# Patient Record
Sex: MALE | URBAN - METROPOLITAN AREA
[De-identification: names, ages, dates, MRNs, and addresses within clinical notes are randomized per-mention and may not be internally consistent; named-entity substitution may affect disease eponyms.]

---

## 2023-08-23 ENCOUNTER — APPOINTMENT (OUTPATIENT)
Dept: URBAN - METROPOLITAN AREA CLINIC 302 | Age: 73
Setting detail: DERMATOLOGY
End: 2023-08-23

## 2023-08-23 VITALS — WEIGHT: 210 LBS | HEIGHT: 74 IN

## 2023-08-23 DIAGNOSIS — D49.2 NEOPLASM OF UNSPECIFIED BEHAVIOR OF BONE, SOFT TISSUE, AND SKIN: ICD-10-CM

## 2023-08-23 DIAGNOSIS — D18.0 HEMANGIOMA: ICD-10-CM

## 2023-08-23 DIAGNOSIS — D22 MELANOCYTIC NEVI: ICD-10-CM

## 2023-08-23 DIAGNOSIS — L82.1 OTHER SEBORRHEIC KERATOSIS: ICD-10-CM

## 2023-08-23 DIAGNOSIS — L81.4 OTHER MELANIN HYPERPIGMENTATION: ICD-10-CM

## 2023-08-23 PROBLEM — D22.5 MELANOCYTIC NEVI OF TRUNK: Status: ACTIVE | Noted: 2023-08-23

## 2023-08-23 PROBLEM — D22.62 MELANOCYTIC NEVI OF LEFT UPPER LIMB, INCLUDING SHOULDER: Status: ACTIVE | Noted: 2023-08-23

## 2023-08-23 PROBLEM — D18.01 HEMANGIOMA OF SKIN AND SUBCUTANEOUS TISSUE: Status: ACTIVE | Noted: 2023-08-23

## 2023-08-23 PROBLEM — D22.39 MELANOCYTIC NEVI OF OTHER PARTS OF FACE: Status: ACTIVE | Noted: 2023-08-23

## 2023-08-23 PROBLEM — D22.61 MELANOCYTIC NEVI OF RIGHT UPPER LIMB, INCLUDING SHOULDER: Status: ACTIVE | Noted: 2023-08-23

## 2023-08-23 PROCEDURE — OTHER MIPS QUALITY: OTHER

## 2023-08-23 PROCEDURE — OTHER BIOPSY BY SHAVE METHOD: OTHER

## 2023-08-23 PROCEDURE — OTHER COUNSELING: OTHER

## 2023-08-23 PROCEDURE — OTHER REASSURANCE: OTHER

## 2023-08-23 PROCEDURE — 11102 TANGNTL BX SKIN SINGLE LES: CPT

## 2023-08-23 PROCEDURE — 99213 OFFICE O/P EST LOW 20 MIN: CPT | Mod: 25

## 2023-08-23 PROCEDURE — 11103 TANGNTL BX SKIN EA SEP/ADDL: CPT

## 2023-08-23 ASSESSMENT — LOCATION SIMPLE DESCRIPTION DERM
LOCATION SIMPLE: ABDOMEN
LOCATION SIMPLE: RIGHT CHEEK
LOCATION SIMPLE: LEFT CHEEK
LOCATION SIMPLE: LEFT FOREHEAD
LOCATION SIMPLE: RIGHT FOREARM
LOCATION SIMPLE: LEFT UPPER ARM
LOCATION SIMPLE: LEFT NOSE
LOCATION SIMPLE: LEFT FOREARM
LOCATION SIMPLE: RIGHT FOREHEAD
LOCATION SIMPLE: CHEST
LOCATION SIMPLE: RIGHT UPPER ARM

## 2023-08-23 ASSESSMENT — LOCATION DETAILED DESCRIPTION DERM
LOCATION DETAILED: LEFT CENTRAL MALAR CHEEK
LOCATION DETAILED: PERIUMBILICAL SKIN
LOCATION DETAILED: RIGHT SUPERIOR MEDIAL BUCCAL CHEEK
LOCATION DETAILED: LEFT INFERIOR FOREHEAD
LOCATION DETAILED: LEFT ANTERIOR DISTAL UPPER ARM
LOCATION DETAILED: RIGHT ANTERIOR PROXIMAL UPPER ARM
LOCATION DETAILED: LEFT LATERAL SUPERIOR CHEST
LOCATION DETAILED: RIGHT ANTERIOR DISTAL UPPER ARM
LOCATION DETAILED: RIGHT INFERIOR FOREHEAD
LOCATION DETAILED: LEFT LATERAL ABDOMEN
LOCATION DETAILED: LEFT ANTERIOR PROXIMAL UPPER ARM
LOCATION DETAILED: RIGHT VENTRAL PROXIMAL FOREARM
LOCATION DETAILED: RIGHT PROXIMAL DORSAL FOREARM
LOCATION DETAILED: LEFT NASAL ALA
LOCATION DETAILED: LEFT VENTRAL PROXIMAL FOREARM
LOCATION DETAILED: RIGHT MEDIAL SUPERIOR CHEST

## 2023-08-23 ASSESSMENT — LOCATION ZONE DERM
LOCATION ZONE: FACE
LOCATION ZONE: ARM
LOCATION ZONE: NOSE
LOCATION ZONE: TRUNK

## 2023-09-07 ENCOUNTER — APPOINTMENT (OUTPATIENT)
Dept: URBAN - METROPOLITAN AREA CLINIC 302 | Age: 73
Setting detail: DERMATOLOGY
End: 2023-09-07

## 2023-09-07 PROBLEM — C44.622 SQUAMOUS CELL CARCINOMA OF SKIN OF RIGHT UPPER LIMB, INCLUDING SHOULDER: Status: ACTIVE | Noted: 2023-09-07

## 2023-09-07 PROCEDURE — OTHER CURETTAGE AND DESTRUCTION: OTHER

## 2023-09-07 PROCEDURE — OTHER MIPS QUALITY: OTHER

## 2023-09-07 PROCEDURE — 17262 DSTRJ MAL LES T/A/L 1.1-2.0: CPT

## 2023-09-19 ENCOUNTER — APPOINTMENT (OUTPATIENT)
Dept: URBAN - METROPOLITAN AREA CLINIC 301 | Age: 73
Setting detail: DERMATOLOGY
End: 2023-09-20

## 2023-09-19 PROBLEM — C44.311 BASAL CELL CARCINOMA OF SKIN OF NOSE: Status: ACTIVE | Noted: 2023-09-19

## 2023-09-19 PROCEDURE — 77334 RADIATION TREATMENT AID(S): CPT

## 2023-09-19 PROCEDURE — OTHER IMAGE GUIDED - SUPERFICIAL RADIOTHERAPY: OTHER

## 2023-09-19 PROCEDURE — G6001 ECHO GUIDANCE RADIOTHERAPY: HCPCS

## 2023-09-19 PROCEDURE — 77261 THER RADIOLOGY TX PLNG SMPL: CPT

## 2023-09-19 PROCEDURE — 77280 THER RAD SIMULAJ FIELD SMPL: CPT

## 2023-09-19 PROCEDURE — 77300 RADIATION THERAPY DOSE PLAN: CPT

## 2023-09-19 PROCEDURE — OTHER IMAGE GUIDED - SUPERFICIAL RADIOTHERAPY: SIMULATION VISIT: OTHER

## 2023-09-19 NOTE — PROCEDURE: IMAGE GUIDED - SUPERFICIAL RADIOTHERAPY: SIMULATION VISIT
Additional Comments (Add Customization Of Note Here): - Mr. Casarez presented for simulation. I instructed him to wash treated site with mild soap, \\navoid scrubbing, avoid shaving, pat dry only, avoid sun exposure, and protect skin with products that contain SPF 30 or \\nhigher. Patient is to avoid lotions with alcohol and/or steroid creams, only using those recommended by provider.

## 2023-09-19 NOTE — PROCEDURE: IMAGE GUIDED - SUPERFICIAL RADIOTHERAPY: SIMULATION VISIT
Bill For Treatment Planning: Yes- (Simple Planning- 1 Site: [id number]) Bill For Treatment Planning: Yes- (Simple Planning- 1 Site: [id number])

## 2023-09-19 NOTE — PROCEDURE: IMAGE GUIDED - SUPERFICIAL RADIOTHERAPY: SIMULATION VISIT
Simulation Documentation: Per the request of Dr. Reyes patient was seen today for Superficial Radiation Therapy planning requiring initial \\nsimulation in preparation for treatment of specific diseased sites. Simulation is necessary to determine the correct \\npatient and treatment portal positioning, to deliver safe and effective Radiotherapy. A high-frequency ultrasound \\nimage was acquired prior to treatment today for two- dimensional evaluation of tumor volume and will be \\nrepeated per fraction for response to treatment, in addition, to geometric accuracy of field placement. Physician \\nevaluation of the ultrasound tumor depth, width, and breadth will be ongoing through the course of treatment \\nand is deemed medically necessary ensuring efficacy of treatment and determine whether to proceed with \\ndelivery of therapeutic. Today’s image and setup were evaluated to determine the initiation of treatment. All \\nappropriate blocking and treatment parameters were verified by the radiation therapist and provider.\\nPer Dr. Clemente, continued per fraction ultrasound guidance and simulation are required for field placement, \\nmeasurement of tumor depth, width and breadth, progress, and edema monitoring.\\nPer the request of Dr. Clemente, continuing medical physics review as per radiotherapy standard of care post every \\n5th fraction for the patient, including assessment of treatment parameters,  of dose delivery, and \\nreview of patient treatment documentation in support of the provider, is ordered, ensuring efficacy and continued \\nsafe delivery of radiotherapy. Included in the physics check is a review of patient setup information, all pertinent \\nsimulation and treatment photograph(s) checks, prescription, dose calculation verification, per fraction dose \\ncharted correctly, elapsed days and treatment days correctly charted, cumulative dose correct, and review of any \\nprescription changes. Continued medical physics review post every 5th fraction of radiotherapy is requested by the \\nprovider for appropriate radiotherapy management and is deemed medically necessary and standard of care.

## 2023-09-19 NOTE — PROCEDURE: IMAGE GUIDED - SUPERFICIAL RADIOTHERAPY
Body Location Override (Optional): Lt Nasal Ala
Detail Level: Detailed
Pathology Override (Pathology Will Render As Diagnosis Name If Left Blank): BCC
Field Number: 1
Lesion Dimensions-X Axis In Cm: 0.6
Lesion Dimensions-Y Axis In Cm: 0.7
Lesion Margin Size In Cm: 0.8
Shield Size In Cm: 2.5
Applicator Size In Cm: 3.0 cm
Energy (Kv): 100
Treatment Time (Min): 0.42
Time, Dose, Fractionation Factor (Tdf) For Prescription 1: 98
Daily Dose (Cgy): 274.68
Number Of Fractions For Prescription 1: 20
Treatments Per Week: 3
Total Dose For Prescription 1 (Cgy): 5493.60
Add A Second Prescription?: No
Additional Fraction(S) Needed:: 0
Bill For Physics Consultation: No - Render Text Only
Physics Documentation: (Physics Check Verbiage)

## 2023-09-26 ENCOUNTER — APPOINTMENT (OUTPATIENT)
Dept: URBAN - METROPOLITAN AREA CLINIC 301 | Age: 73
Setting detail: DERMATOLOGY
End: 2023-09-26

## 2023-09-26 PROBLEM — C44.311 BASAL CELL CARCINOMA OF SKIN OF NOSE: Status: ACTIVE | Noted: 2023-09-26

## 2023-09-26 PROCEDURE — G6001 ECHO GUIDANCE RADIOTHERAPY: HCPCS | Mod: XU

## 2023-09-26 PROCEDURE — 77280 THER RAD SIMULAJ FIELD SMPL: CPT

## 2023-09-26 PROCEDURE — OTHER IMAGE GUIDED - SUPERFICIAL RADIOTHERAPY: OTHER

## 2023-09-26 PROCEDURE — OTHER IMAGE GUIDED - SUPERFICIAL RADIOTHERAPY: TREATMENT VISIT: OTHER

## 2023-09-26 PROCEDURE — 77401 RADIATION TX DELIVERY SUPFC: CPT

## 2023-09-26 NOTE — PROCEDURE: IMAGE GUIDED - SUPERFICIAL RADIOTHERAPY: TREATMENT VISIT
Additional Change To Daily Dosage Administered Mid Treatment?: No
Total Cumulative Dose (Cgy): 274.68
Add X Modifier?: MEJÍA - Unusual Non-Overlapping Service
Treatment Documentation: This patient has been treated today with image-guided superficial radiation therapy for non-melanoma skin \\ncancer. Written informed consent has been previously obtained from this patient for this treatment. This \\nconsent is documented in the patient's chart. The patient gave verbal consent to continue treatment today. \\nThe patient was treated with a specific radiation dose and setup as prescribed by the provider listed on this \\nvisit note. A Radiation Therapist performed administration of radiation under the supervision of a provider. \\nThe treatment parameters and cumulative dose are indicated above. Prior to administering the radiation, the \\npatient underwent a verification therapeutic radiology simulation-aided field setting defining relevant normal \\nand abnormal target anatomy and acquiring images with separate and distinct diagnostic high-frequency \\nultrasound to delineate tissues and determine whether to proceed with delivery of therapeutic, in addition \\nto retrieve data necessary to develop an optimal radiation treatment process for the patient. The field \\nplacement simulation documents any change seen in overall tumor volume documented in the patient’s \\nrecord, allows the clinician to indicate any needed changes in the treatment plan and/or prescription, \\nprovides diagnostic evaluation as the basis for performing the therapeutic procedure, and clearly identifies \\nthe information needed to decide to proceed with the therapeutic procedure. This process includes\\nverification of the treatment port(s) and proper treatment positioning. All treatment ports were \\nphotographed within electronic medical records. The patient's lead blocking along with gross tumor volume \\nand margin was confirmed. Considering superficial radiotherapy is clinical in setup, thisrequiresthe physician \\nand radiation therapist to clarify the location interest being treated against initial images, ultrasound, \\npathology, and patient anatomy. Care was taken to ensure sanders treated were geometrically accurate and \\nproperly positioned using therapeutic radiology simulation-aided field setting verification per fraction. This \\nprocess is also utilized to determine if any prescription or setup changes are necessary. These steps are \\ntherefore medically necessary to ensure safe and effective administration of radiation. Ongoing therapeutic \\nradiology simulation-aided field setting verification is ordered throughout the course of therapy.\\nA high-frequency ultrasound image was acquired today for a two-dimensional evaluation of the tumor \\nvolume, depth, width, breadth, review of prior response to treatment, provide geometric accuracy of field \\nplacement, and determine whether to proceed with therapeutic delivery. \\nThe field placement and ultrasound imaging, per fraction, is separate and distinct from the initial simulation \\nand is an important task in providing safe administration of superficial radiation therapy. Physician evaluation \\nof the ultrasound information will be ongoing throughout the course of treatment and is deemed medically \\nnecessary to ensure the efficacy of treatment, whether to proceed with therapeutic delivery, and determine \\charmaine necessary changes. Today's images were evaluated for determination of continuation of treatment with \\nthe current plan or with necessary changes as appropriate. Additionally, the use of ultrasound visualization \\nand targeted assessment allows the patient to be able to see their cancer(s) progress, encouraging the patient \\nto complete and maintain compliance through the full course of prescribed radiotherapy. Per Dr. Clemente , continued \\nultrasound guidance and therapeutic radiology simulation-aided field setting verification per fraction is \\nrequired for field placement, measurement of tumor depth, tissue evaluation, progress, acute effect \\nmonitoring, and determination for therapeutic treatment delivery is appropriate.
Show Ultrasound In Note?: Yes
Prescription Used: 1
Calculate Total Cumulative Dose Automatically Or Manually: Manually
Energy (Kv): 100
Ultrasound Used Text: High frequency ultrasound depth is 1.46 mm, which is 0.06 mm in difference from previous imaging.
Ultrasound Not Used Text: Ultrasound was not performed today due to

## 2023-09-27 ENCOUNTER — APPOINTMENT (OUTPATIENT)
Dept: URBAN - METROPOLITAN AREA CLINIC 301 | Age: 73
Setting detail: DERMATOLOGY
End: 2023-09-28

## 2023-09-27 PROBLEM — C44.311 BASAL CELL CARCINOMA OF SKIN OF NOSE: Status: ACTIVE | Noted: 2023-09-27

## 2023-09-27 PROCEDURE — 77401 RADIATION TX DELIVERY SUPFC: CPT

## 2023-09-27 PROCEDURE — OTHER IMAGE GUIDED - SUPERFICIAL RADIOTHERAPY: OTHER

## 2023-09-27 PROCEDURE — G6001 ECHO GUIDANCE RADIOTHERAPY: HCPCS | Mod: XU

## 2023-09-27 PROCEDURE — 77280 THER RAD SIMULAJ FIELD SMPL: CPT

## 2023-09-27 PROCEDURE — OTHER IMAGE GUIDED - SUPERFICIAL RADIOTHERAPY: TREATMENT VISIT: OTHER

## 2023-09-27 NOTE — PROCEDURE: IMAGE GUIDED - SUPERFICIAL RADIOTHERAPY: TREATMENT VISIT
Additional Change To Daily Dosage Administered Mid Treatment?: No
Total Cumulative Dose (Cgy): 549.36
Add X Modifier?: MEJÍA - Unusual Non-Overlapping Service
Treatment Documentation: This patient has been treated today with image-guided superficial radiation therapy for non-melanoma skin \\ncancer. Written informed consent has been previously obtained from this patient for this treatment. This \\nconsent is documented in the patient's chart. The patient gave verbal consent to continue treatment today. \\nThe patient was treated with a specific radiation dose and setup as prescribed by the provider listed on this \\nvisit note. A Radiation Therapist performed administration of radiation under the supervision of a provider. \\nThe treatment parameters and cumulative dose are indicated above. Prior to administering the radiation, the \\npatient underwent a verification therapeutic radiology simulation-aided field setting defining relevant normal \\nand abnormal target anatomy and acquiring images with separate and distinct diagnostic high-frequency \\nultrasound to delineate tissues and determine whether to proceed with delivery of therapeutic, in addition \\nto retrieve data necessary to develop an optimal radiation treatment process for the patient. The field \\nplacement simulation documents any change seen in overall tumor volume documented in the patient’s \\nrecord, allows the clinician to indicate any needed changes in the treatment plan and/or prescription, \\nprovides diagnostic evaluation as the basis for performing the therapeutic procedure, and clearly identifies \\nthe information needed to decide to proceed with the therapeutic procedure. This process includes\\nverification of the treatment port(s) and proper treatment positioning. All treatment ports were \\nphotographed within electronic medical records. The patient's lead blocking along with gross tumor volume \\nand margin was confirmed. Considering superficial radiotherapy is clinical in setup, thisrequiresthe physician \\nand radiation therapist to clarify the location interest being treated against initial images, ultrasound, \\npathology, and patient anatomy. Care was taken to ensure sanders treated were geometrically accurate and \\nproperly positioned using therapeutic radiology simulation-aided field setting verification per fraction. This \\nprocess is also utilized to determine if any prescription or setup changes are necessary. These steps are \\ntherefore medically necessary to ensure safe and effective administration of radiation. Ongoing therapeutic \\nradiology simulation-aided field setting verification is ordered throughout the course of therapy.\\nA high-frequency ultrasound image was acquired today for a two-dimensional evaluation of the tumor \\nvolume, depth, width, breadth, review of prior response to treatment, provide geometric accuracy of field \\nplacement, and determine whether to proceed with therapeutic delivery. \\nThe field placement and ultrasound imaging, per fraction, is separate and distinct from the initial simulation \\nand is an important task in providing safe administration of superficial radiation therapy. Physician evaluation \\nof the ultrasound information will be ongoing throughout the course of treatment and is deemed medically \\nnecessary to ensure the efficacy of treatment, whether to proceed with therapeutic delivery, and determine \\charmaine necessary changes. Today's images were evaluated for determination of continuation of treatment with \\nthe current plan or with necessary changes as appropriate. Additionally, the use of ultrasound visualization \\nand targeted assessment allows the patient to be able to see their cancer(s) progress, encouraging the patient \\nto complete and maintain compliance through the full course of prescribed radiotherapy. Per Dr. Clemente , continued \\nultrasound guidance and therapeutic radiology simulation-aided field setting verification per fraction is \\nrequired for field placement, measurement of tumor depth, tissue evaluation, progress, acute effect \\nmonitoring, and determination for therapeutic treatment delivery is appropriate.
Show Ultrasound In Note?: Yes
Prescription Used: 1
Calculate Total Cumulative Dose Automatically Or Manually: Manually
Fraction Number: 2
Energy (Kv): 100
Ultrasound Used Text: High frequency ultrasound depth is 1.68 mm, which is 0.24 mm in difference from previous imaging.
Ultrasound Not Used Text: Ultrasound was not performed today due to
Daily Dosage (Cgy): 274.68

## 2023-09-28 ENCOUNTER — APPOINTMENT (OUTPATIENT)
Dept: URBAN - METROPOLITAN AREA CLINIC 301 | Age: 73
Setting detail: DERMATOLOGY
End: 2023-09-28

## 2023-09-28 PROBLEM — C44.311 BASAL CELL CARCINOMA OF SKIN OF NOSE: Status: ACTIVE | Noted: 2023-09-28

## 2023-09-28 PROCEDURE — OTHER IMAGE GUIDED - SUPERFICIAL RADIOTHERAPY: OTHER

## 2023-09-28 PROCEDURE — G6001 ECHO GUIDANCE RADIOTHERAPY: HCPCS | Mod: XU

## 2023-09-28 PROCEDURE — 77401 RADIATION TX DELIVERY SUPFC: CPT

## 2023-09-28 PROCEDURE — OTHER IMAGE GUIDED - SUPERFICIAL RADIOTHERAPY: TREATMENT VISIT: OTHER

## 2023-09-28 PROCEDURE — 77280 THER RAD SIMULAJ FIELD SMPL: CPT

## 2023-09-28 NOTE — PROCEDURE: IMAGE GUIDED - SUPERFICIAL RADIOTHERAPY: TREATMENT VISIT
Additional Change To Daily Dosage Administered Mid Treatment?: No
Total Cumulative Dose (Cgy): 824.04
Add X Modifier?: MEJÍA - Unusual Non-Overlapping Service
Treatment Documentation: This patient has been treated today with image-guided superficial radiation therapy for non-melanoma skin \\ncancer. Written informed consent has been previously obtained from this patient for this treatment. This \\nconsent is documented in the patient's chart. The patient gave verbal consent to continue treatment today. \\nThe patient was treated with a specific radiation dose and setup as prescribed by the provider listed on this \\nvisit note. A Radiation Therapist performed administration of radiation under the supervision of a provider. \\nThe treatment parameters and cumulative dose are indicated above. Prior to administering the radiation, the \\npatient underwent a verification therapeutic radiology simulation-aided field setting defining relevant normal \\nand abnormal target anatomy and acquiring images with separate and distinct diagnostic high-frequency \\nultrasound to delineate tissues and determine whether to proceed with delivery of therapeutic, in addition \\nto retrieve data necessary to develop an optimal radiation treatment process for the patient. The field \\nplacement simulation documents any change seen in overall tumor volume documented in the patient’s \\nrecord, allows the clinician to indicate any needed changes in the treatment plan and/or prescription, \\nprovides diagnostic evaluation as the basis for performing the therapeutic procedure, and clearly identifies \\nthe information needed to decide to proceed with the therapeutic procedure. This process includes\\nverification of the treatment port(s) and proper treatment positioning. All treatment ports were \\nphotographed within electronic medical records. The patient's lead blocking along with gross tumor volume \\nand margin was confirmed. Considering superficial radiotherapy is clinical in setup, thisrequiresthe physician \\nand radiation therapist to clarify the location interest being treated against initial images, ultrasound, \\npathology, and patient anatomy. Care was taken to ensure sanders treated were geometrically accurate and \\nproperly positioned using therapeutic radiology simulation-aided field setting verification per fraction. This \\nprocess is also utilized to determine if any prescription or setup changes are necessary. These steps are \\ntherefore medically necessary to ensure safe and effective administration of radiation. Ongoing therapeutic \\nradiology simulation-aided field setting verification is ordered throughout the course of therapy.\\nA high-frequency ultrasound image was acquired today for a two-dimensional evaluation of the tumor \\nvolume, depth, width, breadth, review of prior response to treatment, provide geometric accuracy of field \\nplacement, and determine whether to proceed with therapeutic delivery. \\nThe field placement and ultrasound imaging, per fraction, is separate and distinct from the initial simulation \\nand is an important task in providing safe administration of superficial radiation therapy. Physician evaluation \\nof the ultrasound information will be ongoing throughout the course of treatment and is deemed medically \\nnecessary to ensure the efficacy of treatment, whether to proceed with therapeutic delivery, and determine \\charmaine necessary changes. Today's images were evaluated for determination of continuation of treatment with \\nthe current plan or with necessary changes as appropriate. Additionally, the use of ultrasound visualization \\nand targeted assessment allows the patient to be able to see their cancer(s) progress, encouraging the patient \\nto complete and maintain compliance through the full course of prescribed radiotherapy. Per Dr. Clemente , continued \\nultrasound guidance and therapeutic radiology simulation-aided field setting verification per fraction is \\nrequired for field placement, measurement of tumor depth, tissue evaluation, progress, acute effect \\nmonitoring, and determination for therapeutic treatment delivery is appropriate.
Show Ultrasound In Note?: Yes
Prescription Used: 1
Calculate Total Cumulative Dose Automatically Or Manually: Manually
Fraction Number: 3
Energy (Kv): 100
Ultrasound Used Text: High frequency ultrasound depth is 1.58 mm, which is 0.10 mm in difference from previous imaging.
Ultrasound Not Used Text: Ultrasound was not performed today due to
Daily Dosage (Cgy): 274.68

## 2023-10-03 ENCOUNTER — APPOINTMENT (OUTPATIENT)
Dept: URBAN - METROPOLITAN AREA CLINIC 301 | Age: 73
Setting detail: DERMATOLOGY
End: 2023-10-05

## 2023-10-03 PROBLEM — C44.311 BASAL CELL CARCINOMA OF SKIN OF NOSE: Status: ACTIVE | Noted: 2023-10-03

## 2023-10-03 PROCEDURE — 77280 THER RAD SIMULAJ FIELD SMPL: CPT

## 2023-10-03 PROCEDURE — G6001 ECHO GUIDANCE RADIOTHERAPY: HCPCS | Mod: XU

## 2023-10-03 PROCEDURE — 77401 RADIATION TX DELIVERY SUPFC: CPT

## 2023-10-03 PROCEDURE — OTHER IMAGE GUIDED - SUPERFICIAL RADIOTHERAPY: TREATMENT VISIT: OTHER

## 2023-10-03 PROCEDURE — OTHER IMAGE GUIDED - SUPERFICIAL RADIOTHERAPY: OTHER

## 2023-10-03 NOTE — PROCEDURE: IMAGE GUIDED - SUPERFICIAL RADIOTHERAPY: TREATMENT VISIT
Additional Change To Daily Dosage Administered Mid Treatment?: No
Total Cumulative Dose (Cgy): 1098.72
Add X Modifier?: MEJÍA - Unusual Non-Overlapping Service
Treatment Documentation: This patient has been treated today with image-guided superficial radiation therapy for non-melanoma skin \\ncancer. Written informed consent has been previously obtained from this patient for this treatment. This \\nconsent is documented in the patient's chart. The patient gave verbal consent to continue treatment today. \\nThe patient was treated with a specific radiation dose and setup as prescribed by the provider listed on this \\nvisit note. A Radiation Therapist performed administration of radiation under the supervision of a provider. \\nThe treatment parameters and cumulative dose are indicated above. Prior to administering the radiation, the \\npatient underwent a verification therapeutic radiology simulation-aided field setting defining relevant normal \\nand abnormal target anatomy and acquiring images with separate and distinct diagnostic high-frequency \\nultrasound to delineate tissues and determine whether to proceed with delivery of therapeutic, in addition \\nto retrieve data necessary to develop an optimal radiation treatment process for the patient. The field \\nplacement simulation documents any change seen in overall tumor volume documented in the patient’s \\nrecord, allows the clinician to indicate any needed changes in the treatment plan and/or prescription, \\nprovides diagnostic evaluation as the basis for performing the therapeutic procedure, and clearly identifies \\nthe information needed to decide to proceed with the therapeutic procedure. This process includes\\nverification of the treatment port(s) and proper treatment positioning. All treatment ports were \\nphotographed within electronic medical records. The patient's lead blocking along with gross tumor volume \\nand margin was confirmed. Considering superficial radiotherapy is clinical in setup, thisrequiresthe physician \\nand radiation therapist to clarify the location interest being treated against initial images, ultrasound, \\npathology, and patient anatomy. Care was taken to ensure sanders treated were geometrically accurate and \\nproperly positioned using therapeutic radiology simulation-aided field setting verification per fraction. This \\nprocess is also utilized to determine if any prescription or setup changes are necessary. These steps are \\ntherefore medically necessary to ensure safe and effective administration of radiation. Ongoing therapeutic \\nradiology simulation-aided field setting verification is ordered throughout the course of therapy.\\nA high-frequency ultrasound image was acquired today for a two-dimensional evaluation of the tumor \\nvolume, depth, width, breadth, review of prior response to treatment, provide geometric accuracy of field \\nplacement, and determine whether to proceed with therapeutic delivery. \\nThe field placement and ultrasound imaging, per fraction, is separate and distinct from the initial simulation \\nand is an important task in providing safe administration of superficial radiation therapy. Physician evaluation \\nof the ultrasound information will be ongoing throughout the course of treatment and is deemed medically \\nnecessary to ensure the efficacy of treatment, whether to proceed with therapeutic delivery, and determine \\charmaine necessary changes. Today's images were evaluated for determination of continuation of treatment with \\nthe current plan or with necessary changes as appropriate. Additionally, the use of ultrasound visualization \\nand targeted assessment allows the patient to be able to see their cancer(s) progress, encouraging the patient \\nto complete and maintain compliance through the full course of prescribed radiotherapy. Per Dr. Clemente , continued \\nultrasound guidance and therapeutic radiology simulation-aided field setting verification per fraction is \\nrequired for field placement, measurement of tumor depth, tissue evaluation, progress, acute effect \\nmonitoring, and determination for therapeutic treatment delivery is appropriate.
Additional Comments (Add Customization Of Note Here): Patient presented with no problems at this time.
Show Ultrasound In Note?: Yes
Prescription Used: 1
Calculate Total Cumulative Dose Automatically Or Manually: Manually
Fraction Number: 4
Energy (Kv): 100
Ultrasound Used Text: High frequency ultrasound depth is 1.50mm, which is 0.08 mm in difference from previous imaging.
Ultrasound Not Used Text: Ultrasound was not performed today due to
Daily Dosage (Cgy): 274.68

## 2023-10-04 ENCOUNTER — APPOINTMENT (OUTPATIENT)
Dept: URBAN - METROPOLITAN AREA CLINIC 301 | Age: 73
Setting detail: DERMATOLOGY
End: 2023-10-05

## 2023-10-04 PROBLEM — C44.311 BASAL CELL CARCINOMA OF SKIN OF NOSE: Status: ACTIVE | Noted: 2023-10-04

## 2023-10-04 PROCEDURE — G6001 ECHO GUIDANCE RADIOTHERAPY: HCPCS | Mod: XU

## 2023-10-04 PROCEDURE — 77427 RADIATION TX MANAGEMENT X5: CPT

## 2023-10-04 PROCEDURE — 77401 RADIATION TX DELIVERY SUPFC: CPT

## 2023-10-04 PROCEDURE — OTHER IMAGE GUIDED - SUPERFICIAL RADIOTHERAPY: EVALUATION VISIT: OTHER

## 2023-10-04 PROCEDURE — OTHER IMAGE GUIDED - SUPERFICIAL RADIOTHERAPY: OTHER

## 2023-10-04 PROCEDURE — 77280 THER RAD SIMULAJ FIELD SMPL: CPT

## 2023-10-04 PROCEDURE — OTHER IMAGE GUIDED - SUPERFICIAL RADIOTHERAPY: TREATMENT VISIT: OTHER

## 2023-10-04 NOTE — PROCEDURE: IMAGE GUIDED - SUPERFICIAL RADIOTHERAPY: TREATMENT VISIT
Additional Change To Daily Dosage Administered Mid Treatment?: No
Total Cumulative Dose (Cgy): 1373.40
Add X Modifier?: MEJÍA - Unusual Non-Overlapping Service
Treatment Documentation: This patient has been treated today with image-guided superficial radiation therapy for non-melanoma skin \\ncancer. Written informed consent has been previously obtained from this patient for this treatment. This \\nconsent is documented in the patient's chart. The patient gave verbal consent to continue treatment today. \\nThe patient was treated with a specific radiation dose and setup as prescribed by the provider listed on this \\nvisit note. A Radiation Therapist performed administration of radiation under the supervision of a provider. \\nThe treatment parameters and cumulative dose are indicated above. Prior to administering the radiation, the \\npatient underwent a verification therapeutic radiology simulation-aided field setting defining relevant normal \\nand abnormal target anatomy and acquiring images with separate and distinct diagnostic high-frequency \\nultrasound to delineate tissues and determine whether to proceed with delivery of therapeutic, in addition \\nto retrieve data necessary to develop an optimal radiation treatment process for the patient. The field \\nplacement simulation documents any change seen in overall tumor volume documented in the patient’s \\nrecord, allows the clinician to indicate any needed changes in the treatment plan and/or prescription, \\nprovides diagnostic evaluation as the basis for performing the therapeutic procedure, and clearly identifies \\nthe information needed to decide to proceed with the therapeutic procedure. This process includes\\nverification of the treatment port(s) and proper treatment positioning. All treatment ports were \\nphotographed within electronic medical records. The patient's lead blocking along with gross tumor volume \\nand margin was confirmed. Considering superficial radiotherapy is clinical in setup, thisrequiresthe physician \\nand radiation therapist to clarify the location interest being treated against initial images, ultrasound, \\npathology, and patient anatomy. Care was taken to ensure sanders treated were geometrically accurate and \\nproperly positioned using therapeutic radiology simulation-aided field setting verification per fraction. This \\nprocess is also utilized to determine if any prescription or setup changes are necessary. These steps are \\ntherefore medically necessary to ensure safe and effective administration of radiation. Ongoing therapeutic \\nradiology simulation-aided field setting verification is ordered throughout the course of therapy.\\nA high-frequency ultrasound image was acquired today for a two-dimensional evaluation of the tumor \\nvolume, depth, width, breadth, review of prior response to treatment, provide geometric accuracy of field \\nplacement, and determine whether to proceed with therapeutic delivery. \\nThe field placement and ultrasound imaging, per fraction, is separate and distinct from the initial simulation \\nand is an important task in providing safe administration of superficial radiation therapy. Physician evaluation \\nof the ultrasound information will be ongoing throughout the course of treatment and is deemed medically \\nnecessary to ensure the efficacy of treatment, whether to proceed with therapeutic delivery, and determine \\charmaine necessary changes. Today's images were evaluated for determination of continuation of treatment with \\nthe current plan or with necessary changes as appropriate. Additionally, the use of ultrasound visualization \\nand targeted assessment allows the patient to be able to see their cancer(s) progress, encouraging the patient \\nto complete and maintain compliance through the full course of prescribed radiotherapy. Per Dr. Clemente , continued \\nultrasound guidance and therapeutic radiology simulation-aided field setting verification per fraction is \\nrequired for field placement, measurement of tumor depth, tissue evaluation, progress, acute effect \\nmonitoring, and determination for therapeutic treatment delivery is appropriate.
Additional Comments (Add Customization Of Note Here): Patient continues to tolerate treatment well at this junction.
Show Ultrasound In Note?: Yes
Prescription Used: 1
Calculate Total Cumulative Dose Automatically Or Manually: Manually
Fraction Number: 5
Energy (Kv): 100
Ultrasound Used Text: High frequency ultrasound depth is 1.57 mm, which is 0.07 mm in difference from previous imaging.
Ultrasound Not Used Text: Ultrasound was not performed today due to
Daily Dosage (Cgy): 274.68

## 2023-10-04 NOTE — PROCEDURE: IMAGE GUIDED - SUPERFICIAL RADIOTHERAPY: EVALUATION VISIT
Bill For Ultrasound Evaluation (): No
Evaluation Plan: Plan: The patient is undergoing superficial radiation therapy for skin cancer and presents for weekly evaluation and management. Per protocol and as documented on the flow sheet, the patient was questioned as to subjective redness, pruritus, pain, drainage, fatigue, or any other symptoms. Objectively, the radiation area was evaluated with regards to erythema, atrophy, scale, crusting, erosion, ulceration, edema, purpura, tenderness, warmth, drainage, and any other findings. The plan was extensively reviewed including dose and dosing schedule. The simulation and clinical setup were also reviewed as were external and any internal shields and based on this review the appropriateness and sufficiency of treatment was determined. \\n\\nA high-frequency ultrasound image was acquired today for a two-dimensional evaluation of the tumor volume, depth, width, breadth, review of prior response to treatment, provide geometric accuracy of field placement, and determine whether to proceed with therapeutic delivery.\\n\\nPer Dr. Peña Clemente, continued ultrasound guidance and therapeutic radiology simulation-aided field setting \\nverification per fraction is required for field placement, measurement of tumor depth, tissues evaluation, progress, acute effect monitoring, and determination for therapeutic treatment delivery is appropriate.
Is This Visit For Evaluation During Treatment Or Follow Up Post Treatment?: evaluation
Radiation Therapy [facility] (Rtog) Score: 0
Ultrasound Used Text: Ultrasound depth is mm.
Assessment: Appropriate reaction
Ultrasound Not Used Text: Ultrasound was not performed today due to
Bill For Evaluation ([id number])?: Yes
Additional Comments (Add Customization Of Note Here): Mr. Casarez has no question or concerns at this time.

## 2023-10-05 ENCOUNTER — APPOINTMENT (OUTPATIENT)
Dept: URBAN - METROPOLITAN AREA CLINIC 301 | Age: 73
Setting detail: DERMATOLOGY
End: 2023-10-06

## 2023-10-05 PROBLEM — C44.311 BASAL CELL CARCINOMA OF SKIN OF NOSE: Status: ACTIVE | Noted: 2023-10-05

## 2023-10-05 PROCEDURE — G6001 ECHO GUIDANCE RADIOTHERAPY: HCPCS | Mod: XU

## 2023-10-05 PROCEDURE — OTHER IMAGE GUIDED - SUPERFICIAL RADIOTHERAPY: OTHER

## 2023-10-05 PROCEDURE — 77280 THER RAD SIMULAJ FIELD SMPL: CPT

## 2023-10-05 PROCEDURE — OTHER IMAGE GUIDED - SUPERFICIAL RADIOTHERAPY: TREATMENT VISIT: OTHER

## 2023-10-05 PROCEDURE — 77401 RADIATION TX DELIVERY SUPFC: CPT

## 2023-10-05 NOTE — PROCEDURE: IMAGE GUIDED - SUPERFICIAL RADIOTHERAPY: TREATMENT VISIT
Additional Change To Daily Dosage Administered Mid Treatment?: No
Total Cumulative Dose (Cgy): 1648.08
Add X Modifier?: MEJÍA - Unusual Non-Overlapping Service
Treatment Documentation: This patient has been treated today with image-guided superficial radiation therapy for non-melanoma skin \\ncancer. Written informed consent has been previously obtained from this patient for this treatment. This \\nconsent is documented in the patient's chart. The patient gave verbal consent to continue treatment today. \\nThe patient was treated with a specific radiation dose and setup as prescribed by the provider listed on this \\nvisit note. A Radiation Therapist performed administration of radiation under the supervision of a provider. \\nThe treatment parameters and cumulative dose are indicated above. Prior to administering the radiation, the \\npatient underwent a verification therapeutic radiology simulation-aided field setting defining relevant normal \\nand abnormal target anatomy and acquiring images with separate and distinct diagnostic high-frequency \\nultrasound to delineate tissues and determine whether to proceed with delivery of therapeutic, in addition \\nto retrieve data necessary to develop an optimal radiation treatment process for the patient. The field \\nplacement simulation documents any change seen in overall tumor volume documented in the patient’s \\nrecord, allows the clinician to indicate any needed changes in the treatment plan and/or prescription, \\nprovides diagnostic evaluation as the basis for performing the therapeutic procedure, and clearly identifies \\nthe information needed to decide to proceed with the therapeutic procedure. This process includes\\nverification of the treatment port(s) and proper treatment positioning. All treatment ports were \\nphotographed within electronic medical records. The patient's lead blocking along with gross tumor volume \\nand margin was confirmed. Considering superficial radiotherapy is clinical in setup, thisrequiresthe physician \\nand radiation therapist to clarify the location interest being treated against initial images, ultrasound, \\npathology, and patient anatomy. Care was taken to ensure sanders treated were geometrically accurate and \\nproperly positioned using therapeutic radiology simulation-aided field setting verification per fraction. This \\nprocess is also utilized to determine if any prescription or setup changes are necessary. These steps are \\ntherefore medically necessary to ensure safe and effective administration of radiation. Ongoing therapeutic \\nradiology simulation-aided field setting verification is ordered throughout the course of therapy.\\nA high-frequency ultrasound image was acquired today for a two-dimensional evaluation of the tumor \\nvolume, depth, width, breadth, review of prior response to treatment, provide geometric accuracy of field \\nplacement, and determine whether to proceed with therapeutic delivery. \\nThe field placement and ultrasound imaging, per fraction, is separate and distinct from the initial simulation \\nand is an important task in providing safe administration of superficial radiation therapy. Physician evaluation \\nof the ultrasound information will be ongoing throughout the course of treatment and is deemed medically \\nnecessary to ensure the efficacy of treatment, whether to proceed with therapeutic delivery, and determine \\charmaine necessary changes. Today's images were evaluated for determination of continuation of treatment with \\nthe current plan or with necessary changes as appropriate. Additionally, the use of ultrasound visualization \\nand targeted assessment allows the patient to be able to see their cancer(s) progress, encouraging the patient \\nto complete and maintain compliance through the full course of prescribed radiotherapy. Per Dr. Clemente , continued \\nultrasound guidance and therapeutic radiology simulation-aided field setting verification per fraction is \\nrequired for field placement, measurement of tumor depth, tissue evaluation, progress, acute effect \\nmonitoring, and determination for therapeutic treatment delivery is appropriate.
Additional Comments (Add Customization Of Note Here): No changes from previous day.
Show Ultrasound In Note?: Yes
Prescription Used: 1
Calculate Total Cumulative Dose Automatically Or Manually: Manually
Fraction Number: 6
Energy (Kv): 100
Ultrasound Used Text: High frequency ultrasound depth is 1.75 mm, which is 0.18 mm in difference from previous imaging.
Ultrasound Not Used Text: Ultrasound was not performed today due to
Daily Dosage (Cgy): 274.68

## 2023-10-07 ENCOUNTER — APPOINTMENT (OUTPATIENT)
Dept: URBAN - METROPOLITAN AREA CLINIC 301 | Age: 73
Setting detail: DERMATOLOGY
End: 2023-10-07

## 2023-10-07 PROBLEM — C44.311 BASAL CELL CARCINOMA OF SKIN OF NOSE: Status: ACTIVE | Noted: 2023-10-07

## 2023-10-07 PROCEDURE — OTHER IMAGE GUIDED - SUPERFICIAL RADIOTHERAPY: OTHER

## 2023-10-07 PROCEDURE — 77336 RADIATION PHYSICS CONSULT: CPT

## 2023-10-07 NOTE — PROCEDURE: IMAGE GUIDED - SUPERFICIAL RADIOTHERAPY
Body Location Override (Optional): Lt Nasal Ala
Detail Level: Detailed
Pathology Override (Pathology Will Render As Diagnosis Name If Left Blank): BCC
Field Number: 1
Lesion Dimensions-X Axis In Cm: 0.6
Lesion Dimensions-Y Axis In Cm: 0.7
Lesion Margin Size In Cm: 0.8
Shield Size In Cm: 2.5
Applicator Size In Cm: 3.0 cm
Energy (Kv): 100
Treatment Time (Min): 0.42
Time, Dose, Fractionation Factor (Tdf) For Prescription 1: 98
Daily Dose (Cgy): 274.68
Number Of Fractions For Prescription 1: 20
Treatments Per Week: 3
Total Dose For Prescription 1 (Cgy): 5493.60
Add A Second Prescription?: No
Additional Fraction(S) Needed:: 0
Add Physics Consultation: Yes
Physics Consultation Performed For Fractions:: 1-6
Physics Documentation: Per the request of Dr. Clemente, continuing medical physics review as per radiotherapy standard of care post every \\n5th fraction for patient, including assessment of treatment parameters,  of dose delivery,\\nand review of patient treatment documentation in support of the provider, to ensure efficacy and continued \\nsafe delivery of radiotherapy. Included in physics check is review of patient setup information, all pertinent \\nsimulation and treatment photographs checks, prescription, dose calculation verification, per fraction dose \\ncharted correctly, elapsed days and treatment days correctly charted, cumulative dose correct, and review of\\charmaine prescription changes. Patient was not present, nor was it necessary for the patient to be present for \\nweekly physics review and no other superficial radiotherapy services were rendered on this day. Continued \\nmedical physics review post every 5th fraction of therapy is requested by provider for appropriate \\nradiotherapy management and is deemed medically necessary and standard of care.

## 2023-10-10 ENCOUNTER — APPOINTMENT (OUTPATIENT)
Dept: URBAN - METROPOLITAN AREA CLINIC 301 | Age: 73
Setting detail: DERMATOLOGY
End: 2023-10-22

## 2023-10-10 PROBLEM — C44.311 BASAL CELL CARCINOMA OF SKIN OF NOSE: Status: ACTIVE | Noted: 2023-10-10

## 2023-10-10 PROCEDURE — 77401 RADIATION TX DELIVERY SUPFC: CPT

## 2023-10-10 PROCEDURE — OTHER IMAGE GUIDED - SUPERFICIAL RADIOTHERAPY: OTHER

## 2023-10-10 PROCEDURE — G6001 ECHO GUIDANCE RADIOTHERAPY: HCPCS | Mod: XU

## 2023-10-10 PROCEDURE — 77280 THER RAD SIMULAJ FIELD SMPL: CPT

## 2023-10-10 PROCEDURE — OTHER IMAGE GUIDED - SUPERFICIAL RADIOTHERAPY: TREATMENT VISIT: OTHER

## 2023-10-10 NOTE — PROCEDURE: IMAGE GUIDED - SUPERFICIAL RADIOTHERAPY: TREATMENT VISIT
Additional Change To Daily Dosage Administered Mid Treatment?: No
Total Cumulative Dose (Cgy): 1922.76
Add X Modifier?: MEJÍA - Unusual Non-Overlapping Service
Treatment Documentation: This patient has been treated today with image-guided superficial radiation therapy for non-melanoma skin \\ncancer. Written informed consent has been previously obtained from this patient for this treatment. This \\nconsent is documented in the patient's chart. The patient gave verbal consent to continue treatment today. \\nThe patient was treated with a specific radiation dose and setup as prescribed by the provider listed on this \\nvisit note. A Radiation Therapist performed administration of radiation under the supervision of a provider. \\nThe treatment parameters and cumulative dose are indicated above. Prior to administering the radiation, the \\npatient underwent a verification therapeutic radiology simulation-aided field setting defining relevant normal \\nand abnormal target anatomy and acquiring images with separate and distinct diagnostic high-frequency \\nultrasound to delineate tissues and determine whether to proceed with delivery of therapeutic, in addition \\nto retrieve data necessary to develop an optimal radiation treatment process for the patient. The field \\nplacement simulation documents any change seen in overall tumor volume documented in the patient’s \\nrecord, allows the clinician to indicate any needed changes in the treatment plan and/or prescription, \\nprovides diagnostic evaluation as the basis for performing the therapeutic procedure, and clearly identifies \\nthe information needed to decide to proceed with the therapeutic procedure. This process includes\\nverification of the treatment port(s) and proper treatment positioning. All treatment ports were \\nphotographed within electronic medical records. The patient's lead blocking along with gross tumor volume \\nand margin was confirmed. Considering superficial radiotherapy is clinical in setup, thisrequiresthe physician \\nand radiation therapist to clarify the location interest being treated against initial images, ultrasound, \\npathology, and patient anatomy. Care was taken to ensure sanders treated were geometrically accurate and \\nproperly positioned using therapeutic radiology simulation-aided field setting verification per fraction. This \\nprocess is also utilized to determine if any prescription or setup changes are necessary. These steps are \\ntherefore medically necessary to ensure safe and effective administration of radiation. Ongoing therapeutic \\nradiology simulation-aided field setting verification is ordered throughout the course of therapy.\\nA high-frequency ultrasound image was acquired today for a two-dimensional evaluation of the tumor \\nvolume, depth, width, breadth, review of prior response to treatment, provide geometric accuracy of field \\nplacement, and determine whether to proceed with therapeutic delivery. \\nThe field placement and ultrasound imaging, per fraction, is separate and distinct from the initial simulation \\nand is an important task in providing safe administration of superficial radiation therapy. Physician evaluation \\nof the ultrasound information will be ongoing throughout the course of treatment and is deemed medically \\nnecessary to ensure the efficacy of treatment, whether to proceed with therapeutic delivery, and determine \\charmaine necessary changes. Today's images were evaluated for determination of continuation of treatment with \\nthe current plan or with necessary changes as appropriate. Additionally, the use of ultrasound visualization \\nand targeted assessment allows the patient to be able to see their cancer(s) progress, encouraging the patient \\nto complete and maintain compliance through the full course of prescribed radiotherapy. Per Dr. Clemente , continued \\nultrasound guidance and therapeutic radiology simulation-aided field setting verification per fraction is \\nrequired for field placement, measurement of tumor depth, tissue evaluation, progress, acute effect \\nmonitoring, and determination for therapeutic treatment delivery is appropriate.
Additional Comments (Add Customization Of Note Here): No changes from previous day.
Show Ultrasound In Note?: Yes
Prescription Used: 1
Calculate Total Cumulative Dose Automatically Or Manually: Manually
Fraction Number: 7
Energy (Kv): 100
Ultrasound Used Text: High frequency ultrasound depth is 1.59 mm, which is 0.16 mm in difference from previous imaging.
Ultrasound Not Used Text: Ultrasound was not performed today due to
Daily Dosage (Cgy): 274.68

## 2023-10-11 ENCOUNTER — APPOINTMENT (OUTPATIENT)
Dept: URBAN - METROPOLITAN AREA CLINIC 301 | Age: 73
Setting detail: DERMATOLOGY
End: 2023-10-22

## 2023-10-11 PROBLEM — C44.311 BASAL CELL CARCINOMA OF SKIN OF NOSE: Status: ACTIVE | Noted: 2023-10-11

## 2023-10-11 PROCEDURE — G6001 ECHO GUIDANCE RADIOTHERAPY: HCPCS | Mod: XU

## 2023-10-11 PROCEDURE — OTHER IMAGE GUIDED - SUPERFICIAL RADIOTHERAPY: TREATMENT VISIT: OTHER

## 2023-10-11 PROCEDURE — OTHER IMAGE GUIDED - SUPERFICIAL RADIOTHERAPY: OTHER

## 2023-10-11 PROCEDURE — 77280 THER RAD SIMULAJ FIELD SMPL: CPT

## 2023-10-11 PROCEDURE — 77401 RADIATION TX DELIVERY SUPFC: CPT

## 2023-10-11 NOTE — PROCEDURE: IMAGE GUIDED - SUPERFICIAL RADIOTHERAPY: TREATMENT VISIT
Additional Change To Daily Dosage Administered Mid Treatment?: No
Total Cumulative Dose (Cgy): 2197.44
Add X Modifier?: MEJÍA - Unusual Non-Overlapping Service
Treatment Documentation: This patient has been treated today with image-guided superficial radiation therapy for non-melanoma skin \\ncancer. Written informed consent has been previously obtained from this patient for this treatment. This \\nconsent is documented in the patient's chart. The patient gave verbal consent to continue treatment today. \\nThe patient was treated with a specific radiation dose and setup as prescribed by the provider listed on this \\nvisit note. A Radiation Therapist performed administration of radiation under the supervision of a provider. \\nThe treatment parameters and cumulative dose are indicated above. Prior to administering the radiation, the \\npatient underwent a verification therapeutic radiology simulation-aided field setting defining relevant normal \\nand abnormal target anatomy and acquiring images with separate and distinct diagnostic high-frequency \\nultrasound to delineate tissues and determine whether to proceed with delivery of therapeutic, in addition \\nto retrieve data necessary to develop an optimal radiation treatment process for the patient. The field \\nplacement simulation documents any change seen in overall tumor volume documented in the patient’s \\nrecord, allows the clinician to indicate any needed changes in the treatment plan and/or prescription, \\nprovides diagnostic evaluation as the basis for performing the therapeutic procedure, and clearly identifies \\nthe information needed to decide to proceed with the therapeutic procedure. This process includes\\nverification of the treatment port(s) and proper treatment positioning. All treatment ports were \\nphotographed within electronic medical records. The patient's lead blocking along with gross tumor volume \\nand margin was confirmed. Considering superficial radiotherapy is clinical in setup, thisrequiresthe physician \\nand radiation therapist to clarify the location interest being treated against initial images, ultrasound, \\npathology, and patient anatomy. Care was taken to ensure sanders treated were geometrically accurate and \\nproperly positioned using therapeutic radiology simulation-aided field setting verification per fraction. This \\nprocess is also utilized to determine if any prescription or setup changes are necessary. These steps are \\ntherefore medically necessary to ensure safe and effective administration of radiation. Ongoing therapeutic \\nradiology simulation-aided field setting verification is ordered throughout the course of therapy.\\nA high-frequency ultrasound image was acquired today for a two-dimensional evaluation of the tumor \\nvolume, depth, width, breadth, review of prior response to treatment, provide geometric accuracy of field \\nplacement, and determine whether to proceed with therapeutic delivery. \\nThe field placement and ultrasound imaging, per fraction, is separate and distinct from the initial simulation \\nand is an important task in providing safe administration of superficial radiation therapy. Physician evaluation \\nof the ultrasound information will be ongoing throughout the course of treatment and is deemed medically \\nnecessary to ensure the efficacy of treatment, whether to proceed with therapeutic delivery, and determine \\charmaine necessary changes. Today's images were evaluated for determination of continuation of treatment with \\nthe current plan or with necessary changes as appropriate. Additionally, the use of ultrasound visualization \\nand targeted assessment allows the patient to be able to see their cancer(s) progress, encouraging the patient \\nto complete and maintain compliance through the full course of prescribed radiotherapy. Per Dr. Clemente , continued \\nultrasound guidance and therapeutic radiology simulation-aided field setting verification per fraction is \\nrequired for field placement, measurement of tumor depth, tissue evaluation, progress, acute effect \\nmonitoring, and determination for therapeutic treatment delivery is appropriate.
Additional Comments (Add Customization Of Note Here): No changes from previous day.
Show Ultrasound In Note?: Yes
Prescription Used: 1
Calculate Total Cumulative Dose Automatically Or Manually: Manually
Fraction Number: 8
Energy (Kv): 100
Ultrasound Used Text: High frequency ultrasound depth is 1.79 mm, which is 0.20 mm in difference from previous imaging.
Ultrasound Not Used Text: Ultrasound was not performed today due to
Daily Dosage (Cgy): 274.68

## 2023-10-12 ENCOUNTER — APPOINTMENT (OUTPATIENT)
Dept: URBAN - METROPOLITAN AREA CLINIC 301 | Age: 73
Setting detail: DERMATOLOGY
End: 2023-10-21

## 2023-10-12 PROBLEM — C44.311 BASAL CELL CARCINOMA OF SKIN OF NOSE: Status: ACTIVE | Noted: 2023-10-12

## 2023-10-12 PROCEDURE — 77280 THER RAD SIMULAJ FIELD SMPL: CPT

## 2023-10-12 PROCEDURE — 77401 RADIATION TX DELIVERY SUPFC: CPT

## 2023-10-12 PROCEDURE — OTHER IMAGE GUIDED - SUPERFICIAL RADIOTHERAPY: TREATMENT VISIT: OTHER

## 2023-10-12 PROCEDURE — G6001 ECHO GUIDANCE RADIOTHERAPY: HCPCS | Mod: XU

## 2023-10-12 PROCEDURE — OTHER IMAGE GUIDED - SUPERFICIAL RADIOTHERAPY: OTHER

## 2023-10-12 NOTE — PROCEDURE: IMAGE GUIDED - SUPERFICIAL RADIOTHERAPY: TREATMENT VISIT
Additional Change To Daily Dosage Administered Mid Treatment?: No
Total Cumulative Dose (Cgy): 2472.12
Add X Modifier?: MEJÍA - Unusual Non-Overlapping Service
Treatment Documentation: This patient has been treated today with image-guided superficial radiation therapy for non-melanoma skin \\ncancer. Written informed consent has been previously obtained from this patient for this treatment. This \\nconsent is documented in the patient's chart. The patient gave verbal consent to continue treatment today. \\nThe patient was treated with a specific radiation dose and setup as prescribed by the provider listed on this \\nvisit note. A Radiation Therapist performed administration of radiation under the supervision of a provider. \\nThe treatment parameters and cumulative dose are indicated above. Prior to administering the radiation, the \\npatient underwent a verification therapeutic radiology simulation-aided field setting defining relevant normal \\nand abnormal target anatomy and acquiring images with separate and distinct diagnostic high-frequency \\nultrasound to delineate tissues and determine whether to proceed with delivery of therapeutic, in addition \\nto retrieve data necessary to develop an optimal radiation treatment process for the patient. The field \\nplacement simulation documents any change seen in overall tumor volume documented in the patient’s \\nrecord, allows the clinician to indicate any needed changes in the treatment plan and/or prescription, \\nprovides diagnostic evaluation as the basis for performing the therapeutic procedure, and clearly identifies \\nthe information needed to decide to proceed with the therapeutic procedure. This process includes\\nverification of the treatment port(s) and proper treatment positioning. All treatment ports were \\nphotographed within electronic medical records. The patient's lead blocking along with gross tumor volume \\nand margin was confirmed. Considering superficial radiotherapy is clinical in setup, thisrequiresthe physician \\nand radiation therapist to clarify the location interest being treated against initial images, ultrasound, \\npathology, and patient anatomy. Care was taken to ensure sanders treated were geometrically accurate and \\nproperly positioned using therapeutic radiology simulation-aided field setting verification per fraction. This \\nprocess is also utilized to determine if any prescription or setup changes are necessary. These steps are \\ntherefore medically necessary to ensure safe and effective administration of radiation. Ongoing therapeutic \\nradiology simulation-aided field setting verification is ordered throughout the course of therapy.\\nA high-frequency ultrasound image was acquired today for a two-dimensional evaluation of the tumor \\nvolume, depth, width, breadth, review of prior response to treatment, provide geometric accuracy of field \\nplacement, and determine whether to proceed with therapeutic delivery. \\nThe field placement and ultrasound imaging, per fraction, is separate and distinct from the initial simulation \\nand is an important task in providing safe administration of superficial radiation therapy. Physician evaluation \\nof the ultrasound information will be ongoing throughout the course of treatment and is deemed medically \\nnecessary to ensure the efficacy of treatment, whether to proceed with therapeutic delivery, and determine \\charmaine necessary changes. Today's images were evaluated for determination of continuation of treatment with \\nthe current plan or with necessary changes as appropriate. Additionally, the use of ultrasound visualization \\nand targeted assessment allows the patient to be able to see their cancer(s) progress, encouraging the patient \\nto complete and maintain compliance through the full course of prescribed radiotherapy. Per Dr. Clemente , continued \\nultrasound guidance and therapeutic radiology simulation-aided field setting verification per fraction is \\nrequired for field placement, measurement of tumor depth, tissue evaluation, progress, acute effect \\nmonitoring, and determination for therapeutic treatment delivery is appropriate.
Additional Comments (Add Customization Of Note Here): Patient presented with no complaints at this time.
Show Ultrasound In Note?: Yes
Prescription Used: 1
Calculate Total Cumulative Dose Automatically Or Manually: Manually
Fraction Number: 9
Energy (Kv): 100
Ultrasound Used Text: High frequency ultrasound depth is 1.54 mm, which is 0.25 mm in difference from previous imaging.
Ultrasound Not Used Text: Ultrasound was not performed today due to
Daily Dosage (Cgy): 274.68

## 2023-10-17 ENCOUNTER — APPOINTMENT (OUTPATIENT)
Dept: URBAN - METROPOLITAN AREA CLINIC 301 | Age: 73
Setting detail: DERMATOLOGY
End: 2023-10-17

## 2023-10-17 PROCEDURE — OTHER IMAGE GUIDED - SUPERFICIAL RADIOTHERAPY: TREATMENT VISIT: OTHER

## 2023-10-17 PROCEDURE — OTHER IMAGE GUIDED - SUPERFICIAL RADIOTHERAPY: OTHER

## 2023-10-17 PROCEDURE — OTHER IMAGE GUIDED - SUPERFICIAL RADIOTHERAPY: EVALUATION VISIT: OTHER

## 2023-10-17 NOTE — PROCEDURE: IMAGE GUIDED - SUPERFICIAL RADIOTHERAPY: EVALUATION VISIT
Bill For Ultrasound Evaluation (): No
Evaluation Plan: Plan: The patient is undergoing superficial radiation therapy for skin cancer and presents for weekly evaluation and management. Per protocol and as documented on the flow sheet, the patient was questioned as to subjective redness, pruritus, pain, drainage, fatigue, or any other symptoms. Objectively, the radiation area was evaluated with regards to erythema, atrophy, scale, crusting, erosion, ulceration, edema, purpura, tenderness, warmth, drainage, and any other findings. The plan was extensively reviewed including dose and dosing schedule. The simulation and clinical setup were also reviewed as were external and any internal shields and based on this review the appropriateness and sufficiency of treatment was determined. \\n\\nA high-frequency ultrasound image was acquired today for a two-dimensional evaluation of the tumor volume, depth, width, breadth, review of prior response to treatment, provide geometric accuracy of field placement, and determine whether to proceed with therapeutic delivery.\\n\\nPer Dr. Peña Clemente, continued ultrasound guidance and therapeutic radiology simulation-aided field setting \\nverification per fraction is required for field placement, measurement of tumor depth, tissues evaluation, progress, acute effect monitoring, and determination for therapeutic treatment delivery is appropriate.
Is This Visit For Evaluation During Treatment Or Follow Up Post Treatment?: evaluation
Radiation Therapy Oncology Group (Rtog) Score: 1
Ultrasound Used Text: Ultrasound depth is mm.
Assessment: Appropriate reaction
Ultrasound Not Used Text: Ultrasound was not performed today due to
Bill For Evaluation ([id number])?: Yes
Additional Comments (Add Customization Of Note Here): Mr. Casarez has no question or concerns at this time.

## 2023-10-17 NOTE — PROCEDURE: IMAGE GUIDED - SUPERFICIAL RADIOTHERAPY: TREATMENT VISIT
Additional Change To Daily Dosage Administered Mid Treatment?: No
Total Cumulative Dose (Cgy): 2746.80
Add X Modifier?: MEJÍA - Unusual Non-Overlapping Service
Treatment Documentation: This patient has been treated today with image-guided superficial radiation therapy for non-melanoma skin \\ncancer. Written informed consent has been previously obtained from this patient for this treatment. This \\nconsent is documented in the patient's chart. The patient gave verbal consent to continue treatment today. \\nThe patient was treated with a specific radiation dose and setup as prescribed by the provider listed on this \\nvisit note. A Radiation Therapist performed administration of radiation under the supervision of a provider. \\nThe treatment parameters and cumulative dose are indicated above. Prior to administering the radiation, the \\npatient underwent a verification therapeutic radiology simulation-aided field setting defining relevant normal \\nand abnormal target anatomy and acquiring images with separate and distinct diagnostic high-frequency \\nultrasound to delineate tissues and determine whether to proceed with delivery of therapeutic, in addition \\nto retrieve data necessary to develop an optimal radiation treatment process for the patient. The field \\nplacement simulation documents any change seen in overall tumor volume documented in the patient’s \\nrecord, allows the clinician to indicate any needed changes in the treatment plan and/or prescription, \\nprovides diagnostic evaluation as the basis for performing the therapeutic procedure, and clearly identifies \\nthe information needed to decide to proceed with the therapeutic procedure. This process includes\\nverification of the treatment port(s) and proper treatment positioning. All treatment ports were \\nphotographed within electronic medical records. The patient's lead blocking along with gross tumor volume \\nand margin was confirmed. Considering superficial radiotherapy is clinical in setup, thisrequiresthe physician \\nand radiation therapist to clarify the location interest being treated against initial images, ultrasound, \\npathology, and patient anatomy. Care was taken to ensure sanders treated were geometrically accurate and \\nproperly positioned using therapeutic radiology simulation-aided field setting verification per fraction. This \\nprocess is also utilized to determine if any prescription or setup changes are necessary. These steps are \\ntherefore medically necessary to ensure safe and effective administration of radiation. Ongoing therapeutic \\nradiology simulation-aided field setting verification is ordered throughout the course of therapy.\\nA high-frequency ultrasound image was acquired today for a two-dimensional evaluation of the tumor \\nvolume, depth, width, breadth, review of prior response to treatment, provide geometric accuracy of field \\nplacement, and determine whether to proceed with therapeutic delivery. \\nThe field placement and ultrasound imaging, per fraction, is separate and distinct from the initial simulation \\nand is an important task in providing safe administration of superficial radiation therapy. Physician evaluation \\nof the ultrasound information will be ongoing throughout the course of treatment and is deemed medically \\nnecessary to ensure the efficacy of treatment, whether to proceed with therapeutic delivery, and determine \\charmaine necessary changes. Today's images were evaluated for determination of continuation of treatment with \\nthe current plan or with necessary changes as appropriate. Additionally, the use of ultrasound visualization \\nand targeted assessment allows the patient to be able to see their cancer(s) progress, encouraging the patient \\nto complete and maintain compliance through the full course of prescribed radiotherapy. Per Dr. Clemente , continued \\nultrasound guidance and therapeutic radiology simulation-aided field setting verification per fraction is \\nrequired for field placement, measurement of tumor depth, tissue evaluation, progress, acute effect \\nmonitoring, and determination for therapeutic treatment delivery is appropriate.
Additional Comments (Add Customization Of Note Here): Patient presented with a faint erythema.
Show Ultrasound In Note?: Yes
Prescription Used: 1
Calculate Total Cumulative Dose Automatically Or Manually: Manually
Fraction Number: 10
Energy (Kv): 100
Ultrasound Used Text: High frequency ultrasound depth is mm, which is mm in difference from previous imaging.
Ultrasound Not Used Text: Ultrasound was not performed today due to
Daily Dosage (Cgy): 274.68

## 2023-10-24 ENCOUNTER — APPOINTMENT (OUTPATIENT)
Dept: URBAN - METROPOLITAN AREA CLINIC 301 | Age: 73
Setting detail: DERMATOLOGY
End: 2023-10-27

## 2023-10-24 PROBLEM — C44.311 BASAL CELL CARCINOMA OF SKIN OF NOSE: Status: ACTIVE | Noted: 2023-10-24

## 2023-10-24 PROCEDURE — 77401 RADIATION TX DELIVERY SUPFC: CPT

## 2023-10-24 PROCEDURE — OTHER IMAGE GUIDED - SUPERFICIAL RADIOTHERAPY: EVALUATION VISIT: OTHER

## 2023-10-24 PROCEDURE — OTHER IMAGE GUIDED - SUPERFICIAL RADIOTHERAPY: OTHER

## 2023-10-24 PROCEDURE — 77280 THER RAD SIMULAJ FIELD SMPL: CPT

## 2023-10-24 PROCEDURE — G6001 ECHO GUIDANCE RADIOTHERAPY: HCPCS | Mod: XU

## 2023-10-24 PROCEDURE — 77427 RADIATION TX MANAGEMENT X5: CPT

## 2023-10-24 PROCEDURE — OTHER IMAGE GUIDED - SUPERFICIAL RADIOTHERAPY: TREATMENT VISIT: OTHER

## 2023-10-24 NOTE — PROCEDURE: IMAGE GUIDED - SUPERFICIAL RADIOTHERAPY: TREATMENT VISIT
Additional Change To Daily Dosage Administered Mid Treatment?: No
Total Cumulative Dose (Cgy): 2746.80
Add X Modifier?: MEJÍA - Unusual Non-Overlapping Service
Treatment Documentation: This patient has been treated today with image-guided superficial radiation therapy for non-melanoma skin \\ncancer. Written informed consent has been previously obtained from this patient for this treatment. This \\nconsent is documented in the patient's chart. The patient gave verbal consent to continue treatment today. \\nThe patient was treated with a specific radiation dose and setup as prescribed by the provider listed on this \\nvisit note. A Radiation Therapist performed administration of radiation under the supervision of a provider. \\nThe treatment parameters and cumulative dose are indicated above. Prior to administering the radiation, the \\npatient underwent a verification therapeutic radiology simulation-aided field setting defining relevant normal \\nand abnormal target anatomy and acquiring images with separate and distinct diagnostic high-frequency \\nultrasound to delineate tissues and determine whether to proceed with delivery of therapeutic, in addition \\nto retrieve data necessary to develop an optimal radiation treatment process for the patient. The field \\nplacement simulation documents any change seen in overall tumor volume documented in the patient’s \\nrecord, allows the clinician to indicate any needed changes in the treatment plan and/or prescription, \\nprovides diagnostic evaluation as the basis for performing the therapeutic procedure, and clearly identifies \\nthe information needed to decide to proceed with the therapeutic procedure. This process includes\\nverification of the treatment port(s) and proper treatment positioning. All treatment ports were \\nphotographed within electronic medical records. The patient's lead blocking along with gross tumor volume \\nand margin was confirmed. Considering superficial radiotherapy is clinical in setup, thisrequiresthe physician \\nand radiation therapist to clarify the location interest being treated against initial images, ultrasound, \\npathology, and patient anatomy. Care was taken to ensure sanders treated were geometrically accurate and \\nproperly positioned using therapeutic radiology simulation-aided field setting verification per fraction. This \\nprocess is also utilized to determine if any prescription or setup changes are necessary. These steps are \\ntherefore medically necessary to ensure safe and effective administration of radiation. Ongoing therapeutic \\nradiology simulation-aided field setting verification is ordered throughout the course of therapy.\\nA high-frequency ultrasound image was acquired today for a two-dimensional evaluation of the tumor \\nvolume, depth, width, breadth, review of prior response to treatment, provide geometric accuracy of field \\nplacement, and determine whether to proceed with therapeutic delivery. \\nThe field placement and ultrasound imaging, per fraction, is separate and distinct from the initial simulation \\nand is an important task in providing safe administration of superficial radiation therapy. Physician evaluation \\nof the ultrasound information will be ongoing throughout the course of treatment and is deemed medically \\nnecessary to ensure the efficacy of treatment, whether to proceed with therapeutic delivery, and determine \\charmaine necessary changes. Today's images were evaluated for determination of continuation of treatment with \\nthe current plan or with necessary changes as appropriate. Additionally, the use of ultrasound visualization \\nand targeted assessment allows the patient to be able to see their cancer(s) progress, encouraging the patient \\nto complete and maintain compliance through the full course of prescribed radiotherapy. Per Dr. Clemente , continued \\nultrasound guidance and therapeutic radiology simulation-aided field setting verification per fraction is \\nrequired for field placement, measurement of tumor depth, tissue evaluation, progress, acute effect \\nmonitoring, and determination for therapeutic treatment delivery is appropriate.
Additional Comments (Add Customization Of Note Here): Patient presented with no questions or concerns at this time.
Show Ultrasound In Note?: Yes
Prescription Used: 1
Calculate Total Cumulative Dose Automatically Or Manually: Manually
Fraction Number: 10
Energy (Kv): 100
Ultrasound Used Text: High frequency ultrasound depth is 1.83 mm, which is 0.29 mm in difference from previous imaging.
Ultrasound Not Used Text: Ultrasound was not performed today due to
Daily Dosage (Cgy): 274.68

## 2023-10-24 NOTE — PROCEDURE: IMAGE GUIDED - SUPERFICIAL RADIOTHERAPY: EVALUATION VISIT
Bill For Ultrasound Evaluation (): Yes
Evaluation Plan: Plan: The patient is undergoing superficial radiation therapy for skin cancer and presents for weekly evaluation and management. Per protocol and as documented on the flow sheet, the patient was questioned as to subjective redness, pruritus, pain, drainage, fatigue, or any other symptoms. Objectively, the radiation area was evaluated with regards to erythema, atrophy, scale, crusting, erosion, ulceration, edema, purpura, tenderness, warmth, drainage, and any other findings. The plan was extensively reviewed including dose and dosing schedule. The simulation and clinical setup were also reviewed as were external and any internal shields and based on this review the appropriateness and sufficiency of treatment was determined. \\n\\nA high-frequency ultrasound image was acquired today for a two-dimensional evaluation of the tumor volume, depth, width, breadth, review of prior response to treatment, provide geometric accuracy of field placement, and determine whether to proceed with therapeutic delivery.\\n\\nPer Dr. Peña Clemente, continued ultrasound guidance and therapeutic radiology simulation-aided field setting \\nverification per fraction is required for field placement, measurement of tumor depth, tissues evaluation, progress, acute effect monitoring, and determination for therapeutic treatment delivery is appropriate.
Is This Visit For Evaluation During Treatment Or Follow Up Post Treatment?: evaluation
Radiation Therapy Oncology Group (Rtog) Score: 1
Ultrasound Used Text: Ultrasound depth is 1.83 mm.
Assessment: Appropriate reaction
Ultrasound Not Used Text: Ultrasound was not performed today due to
Additional Comments (Add Customization Of Note Here): Mr. Casarez has no question or concerns at this time.

## 2023-10-25 ENCOUNTER — APPOINTMENT (OUTPATIENT)
Dept: URBAN - METROPOLITAN AREA CLINIC 301 | Age: 73
Setting detail: DERMATOLOGY
End: 2023-10-26

## 2023-10-25 PROBLEM — C44.311 BASAL CELL CARCINOMA OF SKIN OF NOSE: Status: ACTIVE | Noted: 2023-10-25

## 2023-10-25 PROCEDURE — 77280 THER RAD SIMULAJ FIELD SMPL: CPT

## 2023-10-25 PROCEDURE — OTHER IMAGE GUIDED - SUPERFICIAL RADIOTHERAPY: TREATMENT VISIT: OTHER

## 2023-10-25 PROCEDURE — OTHER IMAGE GUIDED - SUPERFICIAL RADIOTHERAPY: OTHER

## 2023-10-25 PROCEDURE — 77401 RADIATION TX DELIVERY SUPFC: CPT

## 2023-10-25 PROCEDURE — G6001 ECHO GUIDANCE RADIOTHERAPY: HCPCS | Mod: XU

## 2023-10-25 NOTE — PROCEDURE: IMAGE GUIDED - SUPERFICIAL RADIOTHERAPY: TREATMENT VISIT
Additional Change To Daily Dosage Administered Mid Treatment?: No
Total Cumulative Dose (Cgy): 3021.48
Add X Modifier?: MEJÍA - Unusual Non-Overlapping Service
Treatment Documentation: This patient has been treated today with image-guided superficial radiation therapy for non-melanoma skin \\ncancer. Written informed consent has been previously obtained from this patient for this treatment. This \\nconsent is documented in the patient's chart. The patient gave verbal consent to continue treatment today. \\nThe patient was treated with a specific radiation dose and setup as prescribed by the provider listed on this \\nvisit note. A Radiation Therapist performed administration of radiation under the supervision of a provider. \\nThe treatment parameters and cumulative dose are indicated above. Prior to administering the radiation, the \\npatient underwent a verification therapeutic radiology simulation-aided field setting defining relevant normal \\nand abnormal target anatomy and acquiring images with separate and distinct diagnostic high-frequency \\nultrasound to delineate tissues and determine whether to proceed with delivery of therapeutic, in addition \\nto retrieve data necessary to develop an optimal radiation treatment process for the patient. The field \\nplacement simulation documents any change seen in overall tumor volume documented in the patient’s \\nrecord, allows the clinician to indicate any needed changes in the treatment plan and/or prescription, \\nprovides diagnostic evaluation as the basis for performing the therapeutic procedure, and clearly identifies \\nthe information needed to decide to proceed with the therapeutic procedure. This process includes\\nverification of the treatment port(s) and proper treatment positioning. All treatment ports were \\nphotographed within electronic medical records. The patient's lead blocking along with gross tumor volume \\nand margin was confirmed. Considering superficial radiotherapy is clinical in setup, thisrequiresthe physician \\nand radiation therapist to clarify the location interest being treated against initial images, ultrasound, \\npathology, and patient anatomy. Care was taken to ensure sanders treated were geometrically accurate and \\nproperly positioned using therapeutic radiology simulation-aided field setting verification per fraction. This \\nprocess is also utilized to determine if any prescription or setup changes are necessary. These steps are \\ntherefore medically necessary to ensure safe and effective administration of radiation. Ongoing therapeutic \\nradiology simulation-aided field setting verification is ordered throughout the course of therapy.\\nA high-frequency ultrasound image was acquired today for a two-dimensional evaluation of the tumor \\nvolume, depth, width, breadth, review of prior response to treatment, provide geometric accuracy of field \\nplacement, and determine whether to proceed with therapeutic delivery. \\nThe field placement and ultrasound imaging, per fraction, is separate and distinct from the initial simulation \\nand is an important task in providing safe administration of superficial radiation therapy. Physician evaluation \\nof the ultrasound information will be ongoing throughout the course of treatment and is deemed medically \\nnecessary to ensure the efficacy of treatment, whether to proceed with therapeutic delivery, and determine \\charmaine necessary changes. Today's images were evaluated for determination of continuation of treatment with \\nthe current plan or with necessary changes as appropriate. Additionally, the use of ultrasound visualization \\nand targeted assessment allows the patient to be able to see their cancer(s) progress, encouraging the patient \\nto complete and maintain compliance through the full course of prescribed radiotherapy. Per Dr. Clemente , continued \\nultrasound guidance and therapeutic radiology simulation-aided field setting verification per fraction is \\nrequired for field placement, measurement of tumor depth, tissue evaluation, progress, acute effect \\nmonitoring, and determination for therapeutic treatment delivery is appropriate.
Additional Comments (Add Customization Of Note Here): Presents with no change from previous treatment.
Show Ultrasound In Note?: Yes
Prescription Used: 1
Calculate Total Cumulative Dose Automatically Or Manually: Manually
Fraction Number: 11
Energy (Kv): 100
Ultrasound Used Text: High frequency ultrasound depth is 1.76 mm, which is 0.07 mm in difference from previous imaging.
Ultrasound Not Used Text: Ultrasound was not performed today due to
Daily Dosage (Cgy): 274.68

## 2023-10-26 ENCOUNTER — APPOINTMENT (OUTPATIENT)
Dept: URBAN - METROPOLITAN AREA CLINIC 301 | Age: 73
Setting detail: DERMATOLOGY
End: 2023-10-27

## 2023-10-26 PROBLEM — C44.311 BASAL CELL CARCINOMA OF SKIN OF NOSE: Status: ACTIVE | Noted: 2023-10-26

## 2023-10-26 PROCEDURE — OTHER IMAGE GUIDED - SUPERFICIAL RADIOTHERAPY: TREATMENT VISIT: OTHER

## 2023-10-26 PROCEDURE — 77401 RADIATION TX DELIVERY SUPFC: CPT

## 2023-10-26 PROCEDURE — 77280 THER RAD SIMULAJ FIELD SMPL: CPT

## 2023-10-26 PROCEDURE — G6001 ECHO GUIDANCE RADIOTHERAPY: HCPCS | Mod: XU

## 2023-10-26 PROCEDURE — OTHER IMAGE GUIDED - SUPERFICIAL RADIOTHERAPY: OTHER

## 2023-10-26 NOTE — PROCEDURE: IMAGE GUIDED - SUPERFICIAL RADIOTHERAPY: TREATMENT VISIT
Additional Change To Daily Dosage Administered Mid Treatment?: No
Total Cumulative Dose (Cgy): 3296.16
Add X Modifier?: MEJÍA - Unusual Non-Overlapping Service
Treatment Documentation: This patient has been treated today with image-guided superficial radiation therapy for non-melanoma skin \\ncancer. Written informed consent has been previously obtained from this patient for this treatment. This \\nconsent is documented in the patient's chart. The patient gave verbal consent to continue treatment today. \\nThe patient was treated with a specific radiation dose and setup as prescribed by the provider listed on this \\nvisit note. A Radiation Therapist performed administration of radiation under the supervision of a provider. \\nThe treatment parameters and cumulative dose are indicated above. Prior to administering the radiation, the \\npatient underwent a verification therapeutic radiology simulation-aided field setting defining relevant normal \\nand abnormal target anatomy and acquiring images with separate and distinct diagnostic high-frequency \\nultrasound to delineate tissues and determine whether to proceed with delivery of therapeutic, in addition \\nto retrieve data necessary to develop an optimal radiation treatment process for the patient. The field \\nplacement simulation documents any change seen in overall tumor volume documented in the patient’s \\nrecord, allows the clinician to indicate any needed changes in the treatment plan and/or prescription, \\nprovides diagnostic evaluation as the basis for performing the therapeutic procedure, and clearly identifies \\nthe information needed to decide to proceed with the therapeutic procedure. This process includes\\nverification of the treatment port(s) and proper treatment positioning. All treatment ports were \\nphotographed within electronic medical records. The patient's lead blocking along with gross tumor volume \\nand margin was confirmed. Considering superficial radiotherapy is clinical in setup, thisrequiresthe physician \\nand radiation therapist to clarify the location interest being treated against initial images, ultrasound, \\npathology, and patient anatomy. Care was taken to ensure sanders treated were geometrically accurate and \\nproperly positioned using therapeutic radiology simulation-aided field setting verification per fraction. This \\nprocess is also utilized to determine if any prescription or setup changes are necessary. These steps are \\ntherefore medically necessary to ensure safe and effective administration of radiation. Ongoing therapeutic \\nradiology simulation-aided field setting verification is ordered throughout the course of therapy.\\nA high-frequency ultrasound image was acquired today for a two-dimensional evaluation of the tumor \\nvolume, depth, width, breadth, review of prior response to treatment, provide geometric accuracy of field \\nplacement, and determine whether to proceed with therapeutic delivery. \\nThe field placement and ultrasound imaging, per fraction, is separate and distinct from the initial simulation \\nand is an important task in providing safe administration of superficial radiation therapy. Physician evaluation \\nof the ultrasound information will be ongoing throughout the course of treatment and is deemed medically \\nnecessary to ensure the efficacy of treatment, whether to proceed with therapeutic delivery, and determine \\charmaine necessary changes. Today's images were evaluated for determination of continuation of treatment with \\nthe current plan or with necessary changes as appropriate. Additionally, the use of ultrasound visualization \\nand targeted assessment allows the patient to be able to see their cancer(s) progress, encouraging the patient \\nto complete and maintain compliance through the full course of prescribed radiotherapy. Per Dr. Clemente , continued \\nultrasound guidance and therapeutic radiology simulation-aided field setting verification per fraction is \\nrequired for field placement, measurement of tumor depth, tissue evaluation, progress, acute effect \\nmonitoring, and determination for therapeutic treatment delivery is appropriate.
Additional Comments (Add Customization Of Note Here): Presents with no change from previous treatment.
Show Ultrasound In Note?: Yes
Prescription Used: 1
Calculate Total Cumulative Dose Automatically Or Manually: Manually
Fraction Number: 12
Energy (Kv): 100
Ultrasound Used Text: High frequency ultrasound depth is 1.81 mm, which is 0.05 mm in difference from previous imaging.
Ultrasound Not Used Text: Ultrasound was not performed today due to
Daily Dosage (Cgy): 274.68

## 2023-10-28 ENCOUNTER — APPOINTMENT (OUTPATIENT)
Dept: URBAN - METROPOLITAN AREA CLINIC 301 | Age: 73
Setting detail: DERMATOLOGY
End: 2023-10-28

## 2023-10-28 PROBLEM — C44.311 BASAL CELL CARCINOMA OF SKIN OF NOSE: Status: ACTIVE | Noted: 2023-10-28

## 2023-10-28 PROCEDURE — 77336 RADIATION PHYSICS CONSULT: CPT

## 2023-10-28 PROCEDURE — OTHER IMAGE GUIDED - SUPERFICIAL RADIOTHERAPY: OTHER

## 2023-10-28 NOTE — PROCEDURE: IMAGE GUIDED - SUPERFICIAL RADIOTHERAPY
Body Location Override (Optional): Lt Nasal Ala
Detail Level: Detailed
Pathology Override (Pathology Will Render As Diagnosis Name If Left Blank): BCC
Field Number: 1
Lesion Dimensions-X Axis In Cm: 0.6
Lesion Dimensions-Y Axis In Cm: 0.7
Lesion Margin Size In Cm: 0.8
Shield Size In Cm: 2.5
Applicator Size In Cm: 3.0 cm
Energy (Kv): 100
Treatment Time (Min): 0.42
Time, Dose, Fractionation Factor (Tdf) For Prescription 1: 98
Daily Dose (Cgy): 274.68
Number Of Fractions For Prescription 1: 20
Treatments Per Week: 3
Total Dose For Prescription 1 (Cgy): 5493.60
Add A Second Prescription?: No
Additional Fraction(S) Needed:: 0
Add Physics Consultation: Yes
Physics Consultation Performed For Fractions:: 7-12
Physics Documentation: Per the request of Dr. Clemente, continuing medical physics review as per radiotherapy standard of care post every \\n5th fraction for patient, including assessment of treatment parameters,  of dose delivery,\\nand review of patient treatment documentation in support of the provider, to ensure efficacy and continued \\nsafe delivery of radiotherapy. Included in physics check is review of patient setup information, all pertinent \\nsimulation and treatment photographs checks, prescription, dose calculation verification, per fraction dose \\ncharted correctly, elapsed days and treatment days correctly charted, cumulative dose correct, and review of\\charmaine prescription changes. Patient was not present, nor was it necessary for the patient to be present for \\nweekly physics review and no other superficial radiotherapy services were rendered on this day. Continued \\nmedical physics review post every 5th fraction of therapy is requested by provider for appropriate \\nradiotherapy management and is deemed medically necessary and standard of care.

## 2023-10-31 ENCOUNTER — APPOINTMENT (OUTPATIENT)
Dept: URBAN - METROPOLITAN AREA CLINIC 301 | Age: 73
Setting detail: DERMATOLOGY
End: 2023-11-01

## 2023-10-31 PROBLEM — C44.311 BASAL CELL CARCINOMA OF SKIN OF NOSE: Status: ACTIVE | Noted: 2023-10-31

## 2023-10-31 PROCEDURE — G6001 ECHO GUIDANCE RADIOTHERAPY: HCPCS | Mod: XU

## 2023-10-31 PROCEDURE — 77280 THER RAD SIMULAJ FIELD SMPL: CPT

## 2023-10-31 PROCEDURE — OTHER IMAGE GUIDED - SUPERFICIAL RADIOTHERAPY: OTHER

## 2023-10-31 PROCEDURE — 77401 RADIATION TX DELIVERY SUPFC: CPT

## 2023-10-31 PROCEDURE — OTHER IMAGE GUIDED - SUPERFICIAL RADIOTHERAPY: TREATMENT VISIT: OTHER

## 2023-10-31 NOTE — PROCEDURE: IMAGE GUIDED - SUPERFICIAL RADIOTHERAPY: TREATMENT VISIT
Additional Change To Daily Dosage Administered Mid Treatment?: No
Total Cumulative Dose (Cgy): 3570.84
Add X Modifier?: MEJÍA - Unusual Non-Overlapping Service
Treatment Documentation: This patient has been treated today with image-guided superficial radiation therapy for non-melanoma skin \\ncancer. Written informed consent has been previously obtained from this patient for this treatment. This \\nconsent is documented in the patient's chart. The patient gave verbal consent to continue treatment today. \\nThe patient was treated with a specific radiation dose and setup as prescribed by the provider listed on this \\nvisit note. A Radiation Therapist performed administration of radiation under the supervision of a provider. \\nThe treatment parameters and cumulative dose are indicated above. Prior to administering the radiation, the \\npatient underwent a verification therapeutic radiology simulation-aided field setting defining relevant normal \\nand abnormal target anatomy and acquiring images with separate and distinct diagnostic high-frequency \\nultrasound to delineate tissues and determine whether to proceed with delivery of therapeutic, in addition \\nto retrieve data necessary to develop an optimal radiation treatment process for the patient. The field \\nplacement simulation documents any change seen in overall tumor volume documented in the patient’s \\nrecord, allows the clinician to indicate any needed changes in the treatment plan and/or prescription, \\nprovides diagnostic evaluation as the basis for performing the therapeutic procedure, and clearly identifies \\nthe information needed to decide to proceed with the therapeutic procedure. This process includes\\nverification of the treatment port(s) and proper treatment positioning. All treatment ports were \\nphotographed within electronic medical records. The patient's lead blocking along with gross tumor volume \\nand margin was confirmed. Considering superficial radiotherapy is clinical in setup, thisrequiresthe physician \\nand radiation therapist to clarify the location interest being treated against initial images, ultrasound, \\npathology, and patient anatomy. Care was taken to ensure sanders treated were geometrically accurate and \\nproperly positioned using therapeutic radiology simulation-aided field setting verification per fraction. This \\nprocess is also utilized to determine if any prescription or setup changes are necessary. These steps are \\ntherefore medically necessary to ensure safe and effective administration of radiation. Ongoing therapeutic \\nradiology simulation-aided field setting verification is ordered throughout the course of therapy.\\nA high-frequency ultrasound image was acquired today for a two-dimensional evaluation of the tumor \\nvolume, depth, width, breadth, review of prior response to treatment, provide geometric accuracy of field \\nplacement, and determine whether to proceed with therapeutic delivery. \\nThe field placement and ultrasound imaging, per fraction, is separate and distinct from the initial simulation \\nand is an important task in providing safe administration of superficial radiation therapy. Physician evaluation \\nof the ultrasound information will be ongoing throughout the course of treatment and is deemed medically \\nnecessary to ensure the efficacy of treatment, whether to proceed with therapeutic delivery, and determine \\charmaine necessary changes. Today's images were evaluated for determination of continuation of treatment with \\nthe current plan or with necessary changes as appropriate. Additionally, the use of ultrasound visualization \\nand targeted assessment allows the patient to be able to see their cancer(s) progress, encouraging the patient \\nto complete and maintain compliance through the full course of prescribed radiotherapy. Per Dr. Clemente , continued \\nultrasound guidance and therapeutic radiology simulation-aided field setting verification per fraction is \\nrequired for field placement, measurement of tumor depth, tissue evaluation, progress, acute effect \\nmonitoring, and determination for therapeutic treatment delivery is appropriate.
Additional Comments (Add Customization Of Note Here): Presents with no change from previous treatment.
Show Ultrasound In Note?: Yes
Prescription Used: 1
Calculate Total Cumulative Dose Automatically Or Manually: Manually
Fraction Number: 13
Energy (Kv): 100
Ultrasound Used Text: High frequency ultrasound depth is 1.84 mm, which is 0.03 mm in difference from previous imaging.
Ultrasound Not Used Text: Ultrasound was not performed today due to
Daily Dosage (Cgy): 274.68

## 2023-11-01 ENCOUNTER — APPOINTMENT (OUTPATIENT)
Dept: URBAN - METROPOLITAN AREA CLINIC 301 | Age: 73
Setting detail: DERMATOLOGY
End: 2023-11-02

## 2023-11-01 PROBLEM — C44.311 BASAL CELL CARCINOMA OF SKIN OF NOSE: Status: ACTIVE | Noted: 2023-11-01

## 2023-11-01 PROCEDURE — OTHER IMAGE GUIDED - SUPERFICIAL RADIOTHERAPY: OTHER

## 2023-11-01 PROCEDURE — G6001 ECHO GUIDANCE RADIOTHERAPY: HCPCS | Mod: XU

## 2023-11-01 PROCEDURE — 77401 RADIATION TX DELIVERY SUPFC: CPT

## 2023-11-01 PROCEDURE — OTHER IMAGE GUIDED - SUPERFICIAL RADIOTHERAPY: TREATMENT VISIT: OTHER

## 2023-11-01 PROCEDURE — 77280 THER RAD SIMULAJ FIELD SMPL: CPT

## 2023-11-01 NOTE — PROCEDURE: IMAGE GUIDED - SUPERFICIAL RADIOTHERAPY: TREATMENT VISIT
Additional Change To Daily Dosage Administered Mid Treatment?: No
Total Cumulative Dose (Cgy): 3845.52
Add X Modifier?: MEJÍA - Unusual Non-Overlapping Service
Treatment Documentation: This patient has been treated today with image-guided superficial radiation therapy for non-melanoma skin \\ncancer. Written informed consent has been previously obtained from this patient for this treatment. This \\nconsent is documented in the patient's chart. The patient gave verbal consent to continue treatment today. \\nThe patient was treated with a specific radiation dose and setup as prescribed by the provider listed on this \\nvisit note. A Radiation Therapist performed administration of radiation under the supervision of a provider. \\nThe treatment parameters and cumulative dose are indicated above. Prior to administering the radiation, the \\npatient underwent a verification therapeutic radiology simulation-aided field setting defining relevant normal \\nand abnormal target anatomy and acquiring images with separate and distinct diagnostic high-frequency \\nultrasound to delineate tissues and determine whether to proceed with delivery of therapeutic, in addition \\nto retrieve data necessary to develop an optimal radiation treatment process for the patient. The field \\nplacement simulation documents any change seen in overall tumor volume documented in the patient’s \\nrecord, allows the clinician to indicate any needed changes in the treatment plan and/or prescription, \\nprovides diagnostic evaluation as the basis for performing the therapeutic procedure, and clearly identifies \\nthe information needed to decide to proceed with the therapeutic procedure. This process includes\\nverification of the treatment port(s) and proper treatment positioning. All treatment ports were \\nphotographed within electronic medical records. The patient's lead blocking along with gross tumor volume \\nand margin was confirmed. Considering superficial radiotherapy is clinical in setup, thisrequiresthe physician \\nand radiation therapist to clarify the location interest being treated against initial images, ultrasound, \\npathology, and patient anatomy. Care was taken to ensure sanders treated were geometrically accurate and \\nproperly positioned using therapeutic radiology simulation-aided field setting verification per fraction. This \\nprocess is also utilized to determine if any prescription or setup changes are necessary. These steps are \\ntherefore medically necessary to ensure safe and effective administration of radiation. Ongoing therapeutic \\nradiology simulation-aided field setting verification is ordered throughout the course of therapy.\\nA high-frequency ultrasound image was acquired today for a two-dimensional evaluation of the tumor \\nvolume, depth, width, breadth, review of prior response to treatment, provide geometric accuracy of field \\nplacement, and determine whether to proceed with therapeutic delivery. \\nThe field placement and ultrasound imaging, per fraction, is separate and distinct from the initial simulation \\nand is an important task in providing safe administration of superficial radiation therapy. Physician evaluation \\nof the ultrasound information will be ongoing throughout the course of treatment and is deemed medically \\nnecessary to ensure the efficacy of treatment, whether to proceed with therapeutic delivery, and determine \\charmaine necessary changes. Today's images were evaluated for determination of continuation of treatment with \\nthe current plan or with necessary changes as appropriate. Additionally, the use of ultrasound visualization \\nand targeted assessment allows the patient to be able to see their cancer(s) progress, encouraging the patient \\nto complete and maintain compliance through the full course of prescribed radiotherapy. Per Dr. Clemente , continued \\nultrasound guidance and therapeutic radiology simulation-aided field setting verification per fraction is \\nrequired for field placement, measurement of tumor depth, tissue evaluation, progress, acute effect \\nmonitoring, and determination for therapeutic treatment delivery is appropriate.
Additional Comments (Add Customization Of Note Here): Patient presented with no issues today.
Show Ultrasound In Note?: Yes
Prescription Used: 1
Calculate Total Cumulative Dose Automatically Or Manually: Manually
Fraction Number: 14
Energy (Kv): 100
Ultrasound Used Text: High frequency ultrasound depth is 1.69 mm, which is 0.15 mm in difference from previous imaging.
Ultrasound Not Used Text: Ultrasound was not performed today due to
Daily Dosage (Cgy): 274.68

## 2023-11-02 ENCOUNTER — APPOINTMENT (OUTPATIENT)
Dept: URBAN - METROPOLITAN AREA CLINIC 301 | Age: 73
Setting detail: DERMATOLOGY
End: 2023-11-02

## 2023-11-02 PROBLEM — C44.311 BASAL CELL CARCINOMA OF SKIN OF NOSE: Status: ACTIVE | Noted: 2023-11-02

## 2023-11-02 PROCEDURE — 77427 RADIATION TX MANAGEMENT X5: CPT

## 2023-11-02 PROCEDURE — 77280 THER RAD SIMULAJ FIELD SMPL: CPT

## 2023-11-02 PROCEDURE — OTHER IMAGE GUIDED - SUPERFICIAL RADIOTHERAPY: OTHER

## 2023-11-02 PROCEDURE — OTHER IMAGE GUIDED - SUPERFICIAL RADIOTHERAPY: EVALUATION VISIT: OTHER

## 2023-11-02 PROCEDURE — G6001 ECHO GUIDANCE RADIOTHERAPY: HCPCS | Mod: XU

## 2023-11-02 PROCEDURE — 77401 RADIATION TX DELIVERY SUPFC: CPT

## 2023-11-02 PROCEDURE — OTHER IMAGE GUIDED - SUPERFICIAL RADIOTHERAPY: TREATMENT VISIT: OTHER

## 2023-11-02 NOTE — PROCEDURE: IMAGE GUIDED - SUPERFICIAL RADIOTHERAPY: EVALUATION VISIT
Was Ultrasound Performed Today?: Yes
Ultrasound Used Text: Ultrasound depth is 1.73 mm.
Ultrasound Not Used Text: Ultrasound was not performed today due to
Radiation Therapy Oncology Group (Rtog) Score: 1
Is This Visit For Evaluation During Treatment Or Follow Up Post Treatment?: evaluation
Evaluation Plan: The patient is undergoing superficial radiation therapy for skin cancer and presents for weekly evaluation and \\nmanagement. Per protocol and as documented on the flow sheet, the patient was questioned as to subjective \\nredness, pruritus, pain, drainage, fatigue, or any other symptoms. Objectively, the radiation area was evaluated \\nwith regards to erythema, atrophy, scale, crusting, erosion, ulceration, edema, purpura, tenderness, warmth, \\ndrainage, and any other findings. The plan was extensively reviewed including dose and dosing schedule. The \\nsimulation and clinical setup were also reviewed as were external and any internal shields and based on this review \\nthe appropriateness and sufficiency of treatment was determined.
Assessment: Appropriate reaction

## 2023-11-02 NOTE — PROCEDURE: IMAGE GUIDED - SUPERFICIAL RADIOTHERAPY: TREATMENT VISIT
Additional Change To Daily Dosage Administered Mid Treatment?: No
Total Cumulative Dose (Cgy): 4120.20
Add X Modifier?: MEJÍA - Unusual Non-Overlapping Service
Treatment Documentation: This patient has been treated today with image-guided superficial radiation therapy for non-melanoma skin \\ncancer. Written informed consent has been previously obtained from this patient for this treatment. This \\nconsent is documented in the patient's chart. The patient gave verbal consent to continue treatment today. \\nThe patient was treated with a specific radiation dose and setup as prescribed by the provider listed on this \\nvisit note. A Radiation Therapist performed administration of radiation under the supervision of a provider. \\nThe treatment parameters and cumulative dose are indicated above. Prior to administering the radiation, the \\npatient underwent a verification therapeutic radiology simulation-aided field setting defining relevant normal \\nand abnormal target anatomy and acquiring images with separate and distinct diagnostic high-frequency \\nultrasound to delineate tissues and determine whether to proceed with delivery of therapeutic, in addition \\nto retrieve data necessary to develop an optimal radiation treatment process for the patient. The field \\nplacement simulation documents any change seen in overall tumor volume documented in the patient’s \\nrecord, allows the clinician to indicate any needed changes in the treatment plan and/or prescription, \\nprovides diagnostic evaluation as the basis for performing the therapeutic procedure, and clearly identifies \\nthe information needed to decide to proceed with the therapeutic procedure. This process includes\\nverification of the treatment port(s) and proper treatment positioning. All treatment ports were \\nphotographed within electronic medical records. The patient's lead blocking along with gross tumor volume \\nand margin was confirmed. Considering superficial radiotherapy is clinical in setup, thisrequiresthe physician \\nand radiation therapist to clarify the location interest being treated against initial images, ultrasound, \\npathology, and patient anatomy. Care was taken to ensure sanders treated were geometrically accurate and \\nproperly positioned using therapeutic radiology simulation-aided field setting verification per fraction. This \\nprocess is also utilized to determine if any prescription or setup changes are necessary. These steps are \\ntherefore medically necessary to ensure safe and effective administration of radiation. Ongoing therapeutic \\nradiology simulation-aided field setting verification is ordered throughout the course of therapy.\\nA high-frequency ultrasound image was acquired today for a two-dimensional evaluation of the tumor \\nvolume, depth, width, breadth, review of prior response to treatment, provide geometric accuracy of field \\nplacement, and determine whether to proceed with therapeutic delivery. \\nThe field placement and ultrasound imaging, per fraction, is separate and distinct from the initial simulation \\nand is an important task in providing safe administration of superficial radiation therapy. Physician evaluation \\nof the ultrasound information will be ongoing throughout the course of treatment and is deemed medically \\nnecessary to ensure the efficacy of treatment, whether to proceed with therapeutic delivery, and determine \\charmaine necessary changes. Today's images were evaluated for determination of continuation of treatment with \\nthe current plan or with necessary changes as appropriate. Additionally, the use of ultrasound visualization \\nand targeted assessment allows the patient to be able to see their cancer(s) progress, encouraging the patient \\nto complete and maintain compliance through the full course of prescribed radiotherapy. Per Dr. Clemente , continued \\nultrasound guidance and therapeutic radiology simulation-aided field setting verification per fraction is \\nrequired for field placement, measurement of tumor depth, tissue evaluation, progress, acute effect \\nmonitoring, and determination for therapeutic treatment delivery is appropriate.
Additional Comments (Add Customization Of Note Here): Patient presented with no issues today.
Show Ultrasound In Note?: Yes
Prescription Used: 1
Calculate Total Cumulative Dose Automatically Or Manually: Manually
Fraction Number: 15
Energy (Kv): 100
Ultrasound Used Text: High frequency ultrasound depth is 1.73 mm, which is 0.04 mm in difference from previous imaging.
Ultrasound Not Used Text: Ultrasound was not performed today due to
Daily Dosage (Cgy): 274.68

## 2023-11-07 ENCOUNTER — APPOINTMENT (OUTPATIENT)
Dept: URBAN - METROPOLITAN AREA CLINIC 301 | Age: 73
Setting detail: DERMATOLOGY
End: 2023-11-10

## 2023-11-07 PROBLEM — C44.311 BASAL CELL CARCINOMA OF SKIN OF NOSE: Status: ACTIVE | Noted: 2023-11-07

## 2023-11-07 PROCEDURE — OTHER IMAGE GUIDED - SUPERFICIAL RADIOTHERAPY: OTHER

## 2023-11-07 PROCEDURE — 77280 THER RAD SIMULAJ FIELD SMPL: CPT

## 2023-11-07 PROCEDURE — 77401 RADIATION TX DELIVERY SUPFC: CPT

## 2023-11-07 PROCEDURE — OTHER IMAGE GUIDED - SUPERFICIAL RADIOTHERAPY: TREATMENT VISIT: OTHER

## 2023-11-07 PROCEDURE — G6001 ECHO GUIDANCE RADIOTHERAPY: HCPCS | Mod: XU

## 2023-11-07 NOTE — PROCEDURE: IMAGE GUIDED - SUPERFICIAL RADIOTHERAPY: TREATMENT VISIT
Additional Change To Daily Dosage Administered Mid Treatment?: No
Total Cumulative Dose (Cgy): 4394.88
Add X Modifier?: MEJÍA - Unusual Non-Overlapping Service
Treatment Documentation: This patient has been treated today with image-guided superficial radiation therapy for non-melanoma skin \\ncancer. Written informed consent has been previously obtained from this patient for this treatment. This \\nconsent is documented in the patient's chart. The patient gave verbal consent to continue treatment today. \\nThe patient was treated with a specific radiation dose and setup as prescribed by the provider listed on this \\nvisit note. A Radiation Therapist performed administration of radiation under the supervision of a provider. \\nThe treatment parameters and cumulative dose are indicated above. Prior to administering the radiation, the \\npatient underwent a verification therapeutic radiology simulation-aided field setting defining relevant normal \\nand abnormal target anatomy and acquiring images with separate and distinct diagnostic high-frequency \\nultrasound to delineate tissues and determine whether to proceed with delivery of therapeutic, in addition \\nto retrieve data necessary to develop an optimal radiation treatment process for the patient. The field \\nplacement simulation documents any change seen in overall tumor volume documented in the patient’s \\nrecord, allows the clinician to indicate any needed changes in the treatment plan and/or prescription, \\nprovides diagnostic evaluation as the basis for performing the therapeutic procedure, and clearly identifies \\nthe information needed to decide to proceed with the therapeutic procedure. This process includes\\nverification of the treatment port(s) and proper treatment positioning. All treatment ports were \\nphotographed within electronic medical records. The patient's lead blocking along with gross tumor volume \\nand margin was confirmed. Considering superficial radiotherapy is clinical in setup, thisrequiresthe physician \\nand radiation therapist to clarify the location interest being treated against initial images, ultrasound, \\npathology, and patient anatomy. Care was taken to ensure sanders treated were geometrically accurate and \\nproperly positioned using therapeutic radiology simulation-aided field setting verification per fraction. This \\nprocess is also utilized to determine if any prescription or setup changes are necessary. These steps are \\ntherefore medically necessary to ensure safe and effective administration of radiation. Ongoing therapeutic \\nradiology simulation-aided field setting verification is ordered throughout the course of therapy.\\nA high-frequency ultrasound image was acquired today for a two-dimensional evaluation of the tumor \\nvolume, depth, width, breadth, review of prior response to treatment, provide geometric accuracy of field \\nplacement, and determine whether to proceed with therapeutic delivery. \\nThe field placement and ultrasound imaging, per fraction, is separate and distinct from the initial simulation \\nand is an important task in providing safe administration of superficial radiation therapy. Physician evaluation \\nof the ultrasound information will be ongoing throughout the course of treatment and is deemed medically \\nnecessary to ensure the efficacy of treatment, whether to proceed with therapeutic delivery, and determine \\charmaine necessary changes. Today's images were evaluated for determination of continuation of treatment with \\nthe current plan or with necessary changes as appropriate. Additionally, the use of ultrasound visualization \\nand targeted assessment allows the patient to be able to see their cancer(s) progress, encouraging the patient \\nto complete and maintain compliance through the full course of prescribed radiotherapy. Per Dr. Clemente , continued \\nultrasound guidance and therapeutic radiology simulation-aided field setting verification per fraction is \\nrequired for field placement, measurement of tumor depth, tissue evaluation, progress, acute effect \\nmonitoring, and determination for therapeutic treatment delivery is appropriate.
Additional Comments (Add Customization Of Note Here): Patient presented with no issues today.
Show Ultrasound In Note?: Yes
Prescription Used: 1
Calculate Total Cumulative Dose Automatically Or Manually: Manually
Fraction Number: 16
Energy (Kv): 100
Ultrasound Used Text: High frequency ultrasound depth is 1.91 mm, which is 0.18 mm in difference from previous imaging.
Ultrasound Not Used Text: Ultrasound was not performed today due to
Daily Dosage (Cgy): 274.68

## 2023-11-08 ENCOUNTER — APPOINTMENT (OUTPATIENT)
Dept: URBAN - METROPOLITAN AREA CLINIC 301 | Age: 73
Setting detail: DERMATOLOGY
End: 2023-11-09

## 2023-11-08 PROBLEM — C44.311 BASAL CELL CARCINOMA OF SKIN OF NOSE: Status: ACTIVE | Noted: 2023-11-08

## 2023-11-08 PROCEDURE — 77280 THER RAD SIMULAJ FIELD SMPL: CPT

## 2023-11-08 PROCEDURE — OTHER IMAGE GUIDED - SUPERFICIAL RADIOTHERAPY: TREATMENT VISIT: OTHER

## 2023-11-08 PROCEDURE — 77401 RADIATION TX DELIVERY SUPFC: CPT

## 2023-11-08 PROCEDURE — OTHER IMAGE GUIDED - SUPERFICIAL RADIOTHERAPY: OTHER

## 2023-11-08 PROCEDURE — G6001 ECHO GUIDANCE RADIOTHERAPY: HCPCS | Mod: XU

## 2023-11-08 NOTE — PROCEDURE: IMAGE GUIDED - SUPERFICIAL RADIOTHERAPY: TREATMENT VISIT
Additional Change To Daily Dosage Administered Mid Treatment?: No
Total Cumulative Dose (Cgy): 4669.56
Add X Modifier?: MEJÍA - Unusual Non-Overlapping Service
Treatment Documentation: This patient has been treated today with image-guided superficial radiation therapy for non-melanoma skin \\ncancer. Written informed consent has been previously obtained from this patient for this treatment. This \\nconsent is documented in the patient's chart. The patient gave verbal consent to continue treatment today. \\nThe patient was treated with a specific radiation dose and setup as prescribed by the provider listed on this \\nvisit note. A Radiation Therapist performed administration of radiation under the supervision of a provider. \\nThe treatment parameters and cumulative dose are indicated above. Prior to administering the radiation, the \\npatient underwent a verification therapeutic radiology simulation-aided field setting defining relevant normal \\nand abnormal target anatomy and acquiring images with separate and distinct diagnostic high-frequency \\nultrasound to delineate tissues and determine whether to proceed with delivery of therapeutic, in addition \\nto retrieve data necessary to develop an optimal radiation treatment process for the patient. The field \\nplacement simulation documents any change seen in overall tumor volume documented in the patient’s \\nrecord, allows the clinician to indicate any needed changes in the treatment plan and/or prescription, \\nprovides diagnostic evaluation as the basis for performing the therapeutic procedure, and clearly identifies \\nthe information needed to decide to proceed with the therapeutic procedure. This process includes\\nverification of the treatment port(s) and proper treatment positioning. All treatment ports were \\nphotographed within electronic medical records. The patient's lead blocking along with gross tumor volume \\nand margin was confirmed. Considering superficial radiotherapy is clinical in setup, thisrequiresthe physician \\nand radiation therapist to clarify the location interest being treated against initial images, ultrasound, \\npathology, and patient anatomy. Care was taken to ensure sanders treated were geometrically accurate and \\nproperly positioned using therapeutic radiology simulation-aided field setting verification per fraction. This \\nprocess is also utilized to determine if any prescription or setup changes are necessary. These steps are \\ntherefore medically necessary to ensure safe and effective administration of radiation. Ongoing therapeutic \\nradiology simulation-aided field setting verification is ordered throughout the course of therapy.\\nA high-frequency ultrasound image was acquired today for a two-dimensional evaluation of the tumor \\nvolume, depth, width, breadth, review of prior response to treatment, provide geometric accuracy of field \\nplacement, and determine whether to proceed with therapeutic delivery. \\nThe field placement and ultrasound imaging, per fraction, is separate and distinct from the initial simulation \\nand is an important task in providing safe administration of superficial radiation therapy. Physician evaluation \\nof the ultrasound information will be ongoing throughout the course of treatment and is deemed medically \\nnecessary to ensure the efficacy of treatment, whether to proceed with therapeutic delivery, and determine \\charmaine necessary changes. Today's images were evaluated for determination of continuation of treatment with \\nthe current plan or with necessary changes as appropriate. Additionally, the use of ultrasound visualization \\nand targeted assessment allows the patient to be able to see their cancer(s) progress, encouraging the patient \\nto complete and maintain compliance through the full course of prescribed radiotherapy. Per Dr. Clemente , continued \\nultrasound guidance and therapeutic radiology simulation-aided field setting verification per fraction is \\nrequired for field placement, measurement of tumor depth, tissue evaluation, progress, acute effect \\nmonitoring, and determination for therapeutic treatment delivery is appropriate.
Additional Comments (Add Customization Of Note Here): Patient presented with no issues today.
Show Ultrasound In Note?: Yes
Prescription Used: 1
Calculate Total Cumulative Dose Automatically Or Manually: Manually
Fraction Number: 17
Energy (Kv): 100
Ultrasound Used Text: High frequency ultrasound depth is 1.87 mm, which is 0.04 mm in difference from previous imaging.
Ultrasound Not Used Text: Ultrasound was not performed today due to
Daily Dosage (Cgy): 274.68

## 2023-11-12 ENCOUNTER — APPOINTMENT (OUTPATIENT)
Dept: URBAN - METROPOLITAN AREA CLINIC 301 | Age: 73
Setting detail: DERMATOLOGY
End: 2023-11-12

## 2023-11-12 PROBLEM — C44.311 BASAL CELL CARCINOMA OF SKIN OF NOSE: Status: ACTIVE | Noted: 2023-11-12

## 2023-11-12 PROCEDURE — OTHER IMAGE GUIDED - SUPERFICIAL RADIOTHERAPY: OTHER

## 2023-11-12 PROCEDURE — 77336 RADIATION PHYSICS CONSULT: CPT

## 2023-11-12 NOTE — PROCEDURE: IMAGE GUIDED - SUPERFICIAL RADIOTHERAPY
Body Location Override (Optional): Lt Nasal Ala
Detail Level: Detailed
Pathology Override (Pathology Will Render As Diagnosis Name If Left Blank): BCC
Field Number: 1
Lesion Dimensions-X Axis In Cm: 0.6
Lesion Dimensions-Y Axis In Cm: 0.7
Lesion Margin Size In Cm: 0.8
Shield Size In Cm: 2.5
Applicator Size In Cm: 3.0 cm
Energy (Kv): 100
Treatment Time (Min): 0.42
Time, Dose, Fractionation Factor (Tdf) For Prescription 1: 98
Daily Dose (Cgy): 274.68
Number Of Fractions For Prescription 1: 20
Treatments Per Week: 3
Total Dose For Prescription 1 (Cgy): 5493.60
Add A Second Prescription?: No
Additional Fraction(S) Needed:: 0
Add Physics Consultation: Yes
Physics Consultation Performed For Fractions:: 13-17
Physics Documentation: Per the request of Dr. Clemente, continuing medical physics review as per radiotherapy standard of care post every \\n5th fraction for patient, including assessment of treatment parameters,  of dose delivery,\\nand review of patient treatment documentation in support of the provider, to ensure efficacy and continued \\nsafe delivery of radiotherapy. Included in physics check is review of patient setup information, all pertinent \\nsimulation and treatment photographs checks, prescription, dose calculation verification, per fraction dose \\ncharted correctly, elapsed days and treatment days correctly charted, cumulative dose correct, and review of\\charmaine prescription changes. Patient was not present, nor was it necessary for the patient to be present for \\nweekly physics review and no other superficial radiotherapy services were rendered on this day. Continued \\nmedical physics review post every 5th fraction of therapy is requested by provider for appropriate \\nradiotherapy management and is deemed medically necessary and standard of care.

## 2023-11-14 ENCOUNTER — APPOINTMENT (OUTPATIENT)
Dept: URBAN - METROPOLITAN AREA CLINIC 301 | Age: 73
Setting detail: DERMATOLOGY
End: 2023-11-21

## 2023-11-14 PROBLEM — C44.311 BASAL CELL CARCINOMA OF SKIN OF NOSE: Status: ACTIVE | Noted: 2023-11-14

## 2023-11-14 PROCEDURE — 77280 THER RAD SIMULAJ FIELD SMPL: CPT

## 2023-11-14 PROCEDURE — G6001 ECHO GUIDANCE RADIOTHERAPY: HCPCS | Mod: XU

## 2023-11-14 PROCEDURE — 77401 RADIATION TX DELIVERY SUPFC: CPT

## 2023-11-14 PROCEDURE — OTHER IMAGE GUIDED - SUPERFICIAL RADIOTHERAPY: TREATMENT VISIT: OTHER

## 2023-11-14 PROCEDURE — OTHER IMAGE GUIDED - SUPERFICIAL RADIOTHERAPY: OTHER

## 2023-11-14 NOTE — PROCEDURE: IMAGE GUIDED - SUPERFICIAL RADIOTHERAPY: TREATMENT VISIT
Additional Change To Daily Dosage Administered Mid Treatment?: No
Total Cumulative Dose (Cgy): 4944.24
Add X Modifier?: MEJÍA - Unusual Non-Overlapping Service
Treatment Documentation: This patient has been treated today with image-guided superficial radiation therapy for non-melanoma skin \\ncancer. Written informed consent has been previously obtained from this patient for this treatment. This \\nconsent is documented in the patient's chart. The patient gave verbal consent to continue treatment today. \\nThe patient was treated with a specific radiation dose and setup as prescribed by the provider listed on this \\nvisit note. A Radiation Therapist performed administration of radiation under the supervision of a provider. \\nThe treatment parameters and cumulative dose are indicated above. Prior to administering the radiation, the \\npatient underwent a verification therapeutic radiology simulation-aided field setting defining relevant normal \\nand abnormal target anatomy and acquiring images with separate and distinct diagnostic high-frequency \\nultrasound to delineate tissues and determine whether to proceed with delivery of therapeutic, in addition \\nto retrieve data necessary to develop an optimal radiation treatment process for the patient. The field \\nplacement simulation documents any change seen in overall tumor volume documented in the patient’s \\nrecord, allows the clinician to indicate any needed changes in the treatment plan and/or prescription, \\nprovides diagnostic evaluation as the basis for performing the therapeutic procedure, and clearly identifies \\nthe information needed to decide to proceed with the therapeutic procedure. This process includes\\nverification of the treatment port(s) and proper treatment positioning. All treatment ports were \\nphotographed within electronic medical records. The patient's lead blocking along with gross tumor volume \\nand margin was confirmed. Considering superficial radiotherapy is clinical in setup, thisrequiresthe physician \\nand radiation therapist to clarify the location interest being treated against initial images, ultrasound, \\npathology, and patient anatomy. Care was taken to ensure sanders treated were geometrically accurate and \\nproperly positioned using therapeutic radiology simulation-aided field setting verification per fraction. This \\nprocess is also utilized to determine if any prescription or setup changes are necessary. These steps are \\ntherefore medically necessary to ensure safe and effective administration of radiation. Ongoing therapeutic \\nradiology simulation-aided field setting verification is ordered throughout the course of therapy.\\nA high-frequency ultrasound image was acquired today for a two-dimensional evaluation of the tumor \\nvolume, depth, width, breadth, review of prior response to treatment, provide geometric accuracy of field \\nplacement, and determine whether to proceed with therapeutic delivery. \\nThe field placement and ultrasound imaging, per fraction, is separate and distinct from the initial simulation \\nand is an important task in providing safe administration of superficial radiation therapy. Physician evaluation \\nof the ultrasound information will be ongoing throughout the course of treatment and is deemed medically \\nnecessary to ensure the efficacy of treatment, whether to proceed with therapeutic delivery, and determine \\charmaine necessary changes. Today's images were evaluated for determination of continuation of treatment with \\nthe current plan or with necessary changes as appropriate. Additionally, the use of ultrasound visualization \\nand targeted assessment allows the patient to be able to see their cancer(s) progress, encouraging the patient \\nto complete and maintain compliance through the full course of prescribed radiotherapy. Per Dr. Clemente , continued \\nultrasound guidance and therapeutic radiology simulation-aided field setting verification per fraction is \\nrequired for field placement, measurement of tumor depth, tissue evaluation, progress, acute effect \\nmonitoring, and determination for therapeutic treatment delivery is appropriate.
Additional Comments (Add Customization Of Note Here): Patient presented with no issues today.
Show Ultrasound In Note?: Yes
Prescription Used: 1
Calculate Total Cumulative Dose Automatically Or Manually: Manually
Fraction Number: 18
Energy (Kv): 100
Ultrasound Used Text: High frequency ultrasound depth is 2.06 mm, which is 0.19 mm in difference from previous imaging.
Ultrasound Not Used Text: Ultrasound was not performed today due to
Daily Dosage (Cgy): 274.68

## 2023-11-15 ENCOUNTER — APPOINTMENT (OUTPATIENT)
Dept: URBAN - METROPOLITAN AREA CLINIC 301 | Age: 73
Setting detail: DERMATOLOGY
End: 2023-11-20

## 2023-11-15 PROBLEM — C44.311 BASAL CELL CARCINOMA OF SKIN OF NOSE: Status: ACTIVE | Noted: 2023-11-15

## 2023-11-15 PROCEDURE — G6001 ECHO GUIDANCE RADIOTHERAPY: HCPCS | Mod: XU

## 2023-11-15 PROCEDURE — 77401 RADIATION TX DELIVERY SUPFC: CPT

## 2023-11-15 PROCEDURE — OTHER IMAGE GUIDED - SUPERFICIAL RADIOTHERAPY: TREATMENT VISIT: OTHER

## 2023-11-15 PROCEDURE — OTHER IMAGE GUIDED - SUPERFICIAL RADIOTHERAPY: OTHER

## 2023-11-15 PROCEDURE — 77280 THER RAD SIMULAJ FIELD SMPL: CPT

## 2023-11-15 NOTE — PROCEDURE: IMAGE GUIDED - SUPERFICIAL RADIOTHERAPY: TREATMENT VISIT
Additional Change To Daily Dosage Administered Mid Treatment?: No
Total Cumulative Dose (Cgy): 5218.92
Add X Modifier?: MEJÍA - Unusual Non-Overlapping Service
Treatment Documentation: This patient has been treated today with image-guided superficial radiation therapy for non-melanoma skin \\ncancer. Written informed consent has been previously obtained from this patient for this treatment. This \\nconsent is documented in the patient's chart. The patient gave verbal consent to continue treatment today. \\nThe patient was treated with a specific radiation dose and setup as prescribed by the provider listed on this \\nvisit note. A Radiation Therapist performed administration of radiation under the supervision of a provider. \\nThe treatment parameters and cumulative dose are indicated above. Prior to administering the radiation, the \\npatient underwent a verification therapeutic radiology simulation-aided field setting defining relevant normal \\nand abnormal target anatomy and acquiring images with separate and distinct diagnostic high-frequency \\nultrasound to delineate tissues and determine whether to proceed with delivery of therapeutic, in addition \\nto retrieve data necessary to develop an optimal radiation treatment process for the patient. The field \\nplacement simulation documents any change seen in overall tumor volume documented in the patient’s \\nrecord, allows the clinician to indicate any needed changes in the treatment plan and/or prescription, \\nprovides diagnostic evaluation as the basis for performing the therapeutic procedure, and clearly identifies \\nthe information needed to decide to proceed with the therapeutic procedure. This process includes\\nverification of the treatment port(s) and proper treatment positioning. All treatment ports were \\nphotographed within electronic medical records. The patient's lead blocking along with gross tumor volume \\nand margin was confirmed. Considering superficial radiotherapy is clinical in setup, thisrequiresthe physician \\nand radiation therapist to clarify the location interest being treated against initial images, ultrasound, \\npathology, and patient anatomy. Care was taken to ensure sanders treated were geometrically accurate and \\nproperly positioned using therapeutic radiology simulation-aided field setting verification per fraction. This \\nprocess is also utilized to determine if any prescription or setup changes are necessary. These steps are \\ntherefore medically necessary to ensure safe and effective administration of radiation. Ongoing therapeutic \\nradiology simulation-aided field setting verification is ordered throughout the course of therapy.\\nA high-frequency ultrasound image was acquired today for a two-dimensional evaluation of the tumor \\nvolume, depth, width, breadth, review of prior response to treatment, provide geometric accuracy of field \\nplacement, and determine whether to proceed with therapeutic delivery. \\nThe field placement and ultrasound imaging, per fraction, is separate and distinct from the initial simulation \\nand is an important task in providing safe administration of superficial radiation therapy. Physician evaluation \\nof the ultrasound information will be ongoing throughout the course of treatment and is deemed medically \\nnecessary to ensure the efficacy of treatment, whether to proceed with therapeutic delivery, and determine \\charmaine necessary changes. Today's images were evaluated for determination of continuation of treatment with \\nthe current plan or with necessary changes as appropriate. Additionally, the use of ultrasound visualization \\nand targeted assessment allows the patient to be able to see their cancer(s) progress, encouraging the patient \\nto complete and maintain compliance through the full course of prescribed radiotherapy. Per Dr. Clemente , continued \\nultrasound guidance and therapeutic radiology simulation-aided field setting verification per fraction is \\nrequired for field placement, measurement of tumor depth, tissue evaluation, progress, acute effect \\nmonitoring, and determination for therapeutic treatment delivery is appropriate.
Additional Comments (Add Customization Of Note Here): Patient is tolerating treatment well.
Show Ultrasound In Note?: Yes
Prescription Used: 1
Calculate Total Cumulative Dose Automatically Or Manually: Manually
Fraction Number: 19
Energy (Kv): 100
Ultrasound Used Text: High frequency ultrasound depth is 1.52 mm, which is 0.54 mm in difference from previous imaging.
Ultrasound Not Used Text: Ultrasound was not performed today due to
Daily Dosage (Cgy): 274.68

## 2023-11-16 ENCOUNTER — APPOINTMENT (OUTPATIENT)
Dept: URBAN - METROPOLITAN AREA CLINIC 301 | Age: 73
Setting detail: DERMATOLOGY
End: 2023-11-20

## 2023-11-16 PROBLEM — C44.311 BASAL CELL CARCINOMA OF SKIN OF NOSE: Status: ACTIVE | Noted: 2023-11-16

## 2023-11-16 PROCEDURE — 77401 RADIATION TX DELIVERY SUPFC: CPT

## 2023-11-16 PROCEDURE — OTHER IMAGE GUIDED - SUPERFICIAL RADIOTHERAPY: EVALUATION VISIT: OTHER

## 2023-11-16 PROCEDURE — 77280 THER RAD SIMULAJ FIELD SMPL: CPT

## 2023-11-16 PROCEDURE — OTHER IMAGE GUIDED - SUPERFICIAL RADIOTHERAPY: OTHER

## 2023-11-16 PROCEDURE — G6001 ECHO GUIDANCE RADIOTHERAPY: HCPCS | Mod: XU

## 2023-11-16 PROCEDURE — OTHER IMAGE GUIDED - SUPERFICIAL RADIOTHERAPY: TREATMENT VISIT: OTHER

## 2023-11-16 NOTE — PROCEDURE: IMAGE GUIDED - SUPERFICIAL RADIOTHERAPY: TREATMENT VISIT
Additional Change To Daily Dosage Administered Mid Treatment?: No
Total Cumulative Dose (Cgy): 5394.60
Add X Modifier?: MEJÍA - Unusual Non-Overlapping Service
Treatment Documentation: This patient has been treated today with image-guided superficial radiation therapy for non-melanoma skin \\ncancer. Written informed consent has been previously obtained from this patient for this treatment. This \\nconsent is documented in the patient's chart. The patient gave verbal consent to continue treatment today. \\nThe patient was treated with a specific radiation dose and setup as prescribed by the provider listed on this \\nvisit note. A Radiation Therapist performed administration of radiation under the supervision of a provider. \\nThe treatment parameters and cumulative dose are indicated above. Prior to administering the radiation, the \\npatient underwent a verification therapeutic radiology simulation-aided field setting defining relevant normal \\nand abnormal target anatomy and acquiring images with separate and distinct diagnostic high-frequency \\nultrasound to delineate tissues and determine whether to proceed with delivery of therapeutic, in addition \\nto retrieve data necessary to develop an optimal radiation treatment process for the patient. The field \\nplacement simulation documents any change seen in overall tumor volume documented in the patient’s \\nrecord, allows the clinician to indicate any needed changes in the treatment plan and/or prescription, \\nprovides diagnostic evaluation as the basis for performing the therapeutic procedure, and clearly identifies \\nthe information needed to decide to proceed with the therapeutic procedure. This process includes\\nverification of the treatment port(s) and proper treatment positioning. All treatment ports were \\nphotographed within electronic medical records. The patient's lead blocking along with gross tumor volume \\nand margin was confirmed. Considering superficial radiotherapy is clinical in setup, thisrequiresthe physician \\nand radiation therapist to clarify the location interest being treated against initial images, ultrasound, \\npathology, and patient anatomy. Care was taken to ensure sanders treated were geometrically accurate and \\nproperly positioned using therapeutic radiology simulation-aided field setting verification per fraction. This \\nprocess is also utilized to determine if any prescription or setup changes are necessary. These steps are \\ntherefore medically necessary to ensure safe and effective administration of radiation. Ongoing therapeutic \\nradiology simulation-aided field setting verification is ordered throughout the course of therapy.\\nA high-frequency ultrasound image was acquired today for a two-dimensional evaluation of the tumor \\nvolume, depth, width, breadth, review of prior response to treatment, provide geometric accuracy of field \\nplacement, and determine whether to proceed with therapeutic delivery. \\nThe field placement and ultrasound imaging, per fraction, is separate and distinct from the initial simulation \\nand is an important task in providing safe administration of superficial radiation therapy. Physician evaluation \\nof the ultrasound information will be ongoing throughout the course of treatment and is deemed medically \\nnecessary to ensure the efficacy of treatment, whether to proceed with therapeutic delivery, and determine \\charmaine necessary changes. Today's images were evaluated for determination of continuation of treatment with \\nthe current plan or with necessary changes as appropriate. Additionally, the use of ultrasound visualization \\nand targeted assessment allows the patient to be able to see their cancer(s) progress, encouraging the patient \\nto complete and maintain compliance through the full course of prescribed radiotherapy. Per Dr. Clemente , continued \\nultrasound guidance and therapeutic radiology simulation-aided field setting verification per fraction is \\nrequired for field placement, measurement of tumor depth, tissue evaluation, progress, acute effect \\nmonitoring, and determination for therapeutic treatment delivery is appropriate.
Show Ultrasound In Note?: Yes
Prescription Used: 1
Calculate Total Cumulative Dose Automatically Or Manually: Manually
Fraction Number: 20
Energy (Kv): 100
Ultrasound Used Text: High frequency ultrasound depth is 1.83 mm, which is 0.31 mm in difference from previous imaging.
Ultrasound Not Used Text: Ultrasound was not performed today due to
Daily Dosage (Cgy): 274.68

## 2023-11-19 ENCOUNTER — APPOINTMENT (OUTPATIENT)
Dept: URBAN - METROPOLITAN AREA CLINIC 301 | Age: 73
Setting detail: DERMATOLOGY
End: 2023-11-19

## 2023-11-19 PROBLEM — C44.311 BASAL CELL CARCINOMA OF SKIN OF NOSE: Status: ACTIVE | Noted: 2023-11-19

## 2023-11-19 PROCEDURE — OTHER IMAGE GUIDED - SUPERFICIAL RADIOTHERAPY: OTHER

## 2023-11-19 PROCEDURE — 77336 RADIATION PHYSICS CONSULT: CPT

## 2023-11-19 NOTE — PROCEDURE: IMAGE GUIDED - SUPERFICIAL RADIOTHERAPY
Body Location Override (Optional): Lt Nasal Ala
Detail Level: Detailed
Pathology Override (Pathology Will Render As Diagnosis Name If Left Blank): BCC
Field Number: 1
Lesion Dimensions-X Axis In Cm: 0.6
Lesion Dimensions-Y Axis In Cm: 0.7
Lesion Margin Size In Cm: 0.8
Shield Size In Cm: 2.5
Applicator Size In Cm: 3.0 cm
Energy (Kv): 100
Treatment Time (Min): 0.42
Time, Dose, Fractionation Factor (Tdf) For Prescription 1: 98
Daily Dose (Cgy): 274.68
Number Of Fractions For Prescription 1: 20
Treatments Per Week: 3
Total Dose For Prescription 1 (Cgy): 5493.60
Add A Second Prescription?: No
Additional Fraction(S) Needed:: 0
Add Physics Consultation: Yes
Physics Consultation Performed For Fractions:: 18-20
Physics Documentation: Per the request of Dr. Clemente, continuing medical physics review as per radiotherapy standard of care post every \\n5th fraction for patient, including assessment of treatment parameters,  of dose delivery,\\nand review of patient treatment documentation in support of the provider, to ensure efficacy and continued \\nsafe delivery of radiotherapy. Included in physics check is review of patient setup information, all pertinent \\nsimulation and treatment photographs checks, prescription, dose calculation verification, per fraction dose \\ncharted correctly, elapsed days and treatment days correctly charted, cumulative dose correct, and review of\\charmaine prescription changes. Patient was not present, nor was it necessary for the patient to be present for \\nweekly physics review and no other superficial radiotherapy services were rendered on this day. Continued \\nmedical physics review post every 5th fraction of therapy is requested by provider for appropriate \\nradiotherapy management and is deemed medically necessary and standard of care.

## 2023-12-27 ENCOUNTER — APPOINTMENT (OUTPATIENT)
Dept: URBAN - METROPOLITAN AREA CLINIC 301 | Age: 73
Setting detail: DERMATOLOGY
End: 2023-12-27

## 2023-12-27 PROBLEM — C44.311 BASAL CELL CARCINOMA OF SKIN OF NOSE: Status: ACTIVE | Noted: 2023-12-27

## 2023-12-27 PROCEDURE — G6001 ECHO GUIDANCE RADIOTHERAPY: HCPCS

## 2023-12-27 PROCEDURE — 77427 RADIATION TX MANAGEMENT X5: CPT

## 2023-12-27 PROCEDURE — OTHER IMAGE GUIDED - SUPERFICIAL RADIOTHERAPY: OTHER

## 2023-12-27 PROCEDURE — OTHER IMAGE GUIDED - SUPERFICIAL RADIOTHERAPY: EVALUATION VISIT: OTHER

## 2023-12-27 NOTE — PROCEDURE: IMAGE GUIDED - SUPERFICIAL RADIOTHERAPY: EVALUATION VISIT
Evaluation Plan: Per the request of Dr. Clemente, patient was seen today for a 6 week follow up for Superficial Radiation Therapy. \\nPhysician evaluation of the ultrasound tumor depth, width, and breadth was ongoing through course of treatment \\nand is deemed medically necessary ensuring efficacy of treatment. All appropriate blocking and treatment \\nparameters verified by radiation therapist according to initial simulation. A high frequency ultrasound image was \\nacquired today for two-dimensional evaluation of treatment volume and response to treatment, in addition, \\ngeometric accuracy of field placement. Today’s image was evaluated, lesion not detected on US. \\nObjectively, the treated radiation area was evaluated with regards to erythema, atrophy, scale, crusting, erosion, \\nulceration, edema, purpura, tenderness, warmth, drainage, and any other finding. Patient to follow up for routine \\nvisits.
Ultrasound Used Text: Ultrasound showed no evidence of disease.
Was Ultrasound Performed Today?: Yes
Is This Visit For Evaluation During Treatment Or Follow Up Post Treatment?: evaluation
Assessment: Appropriate reaction
Radiation Therapy Oncology Group (Rtog) Score: 0
Ultrasound Not Used Text: Ultrasound was not performed today due to
No

## 2024-01-15 NOTE — PROCEDURE: IMAGE GUIDED - SUPERFICIAL RADIOTHERAPY: EVALUATION VISIT
Alert-The patient is alert, awake and responds to voice. The patient is oriented to time, place, and person. The triage nurse is able to obtain subjective information.
Evaluation Plan: The patient is undergoing superficial radiation therapy for skin cancer and presents for weekly evaluation and \\nmanagement. Per protocol and as documented on the flow sheet, the patient was questioned as to subjective \\nredness, pruritus, pain, drainage, fatigue, or any other symptoms. Objectively, the radiation area was evaluated \\nwith regards to erythema, atrophy, scale, crusting, erosion, ulceration, edema, purpura, tenderness, warmth, \\ndrainage, and any other findings. The plan was extensively reviewed including dose and dosing schedule. The \\nsimulation and clinical setup were also reviewed as were external and any internal shields and based on this review \\nthe appropriateness and sufficiency of treatment was determined.
Ultrasound Used Text: Ultrasound depth is 1.83 mm.
Was Ultrasound Performed Today?: Yes
Is This Visit For Evaluation During Treatment Or Follow Up Post Treatment?: evaluation
Assessment: Appropriate reaction
Bill For Evaluation (54197)?: No
Additional Comments (Add Customization Of Note Here): Mr. Barajas had no questions or complaints on his last day of treatment. I instructed him to continue to use aquaphor until he is all healed.
Radiation Therapy Oncology Group (Rtog) Score: 2
Ultrasound Not Used Text: Ultrasound was not performed today due to

## 2024-03-01 ENCOUNTER — APPOINTMENT (OUTPATIENT)
Dept: URBAN - METROPOLITAN AREA CLINIC 302 | Age: 74
Setting detail: DERMATOLOGY
End: 2024-03-01

## 2024-03-01 VITALS — HEIGHT: 74 IN | WEIGHT: 212 LBS

## 2024-03-01 DIAGNOSIS — Z85.828 PERSONAL HISTORY OF OTHER MALIGNANT NEOPLASM OF SKIN: ICD-10-CM

## 2024-03-01 DIAGNOSIS — D22 MELANOCYTIC NEVI: ICD-10-CM

## 2024-03-01 DIAGNOSIS — D18.0 HEMANGIOMA: ICD-10-CM

## 2024-03-01 DIAGNOSIS — L91.8 OTHER HYPERTROPHIC DISORDERS OF THE SKIN: ICD-10-CM

## 2024-03-01 DIAGNOSIS — L81.4 OTHER MELANIN HYPERPIGMENTATION: ICD-10-CM

## 2024-03-01 DIAGNOSIS — K13.0 DISEASES OF LIPS: ICD-10-CM

## 2024-03-01 DIAGNOSIS — L82.1 OTHER SEBORRHEIC KERATOSIS: ICD-10-CM

## 2024-03-01 PROBLEM — D22.5 MELANOCYTIC NEVI OF TRUNK: Status: ACTIVE | Noted: 2024-03-01

## 2024-03-01 PROBLEM — D18.01 HEMANGIOMA OF SKIN AND SUBCUTANEOUS TISSUE: Status: ACTIVE | Noted: 2024-03-01

## 2024-03-01 PROBLEM — D22.39 MELANOCYTIC NEVI OF OTHER PARTS OF FACE: Status: ACTIVE | Noted: 2024-03-01

## 2024-03-01 PROBLEM — D22.62 MELANOCYTIC NEVI OF LEFT UPPER LIMB, INCLUDING SHOULDER: Status: ACTIVE | Noted: 2024-03-01

## 2024-03-01 PROBLEM — D22.61 MELANOCYTIC NEVI OF RIGHT UPPER LIMB, INCLUDING SHOULDER: Status: ACTIVE | Noted: 2024-03-01

## 2024-03-01 PROCEDURE — OTHER PRESCRIPTION MEDICATION MANAGEMENT: OTHER

## 2024-03-01 PROCEDURE — OTHER COUNSELING: OTHER

## 2024-03-01 PROCEDURE — OTHER MIPS QUALITY: OTHER

## 2024-03-01 PROCEDURE — OTHER REASSURANCE: OTHER

## 2024-03-01 PROCEDURE — OTHER SKIN TAG REMOVAL: OTHER

## 2024-03-01 PROCEDURE — 99213 OFFICE O/P EST LOW 20 MIN: CPT | Mod: 25

## 2024-03-01 PROCEDURE — 11200 RMVL SKIN TAGS UP TO&INC 15: CPT

## 2024-03-01 PROCEDURE — OTHER PRESCRIPTION: OTHER

## 2024-03-01 RX ORDER — KETOCONAZOLE 20 MG/G
CREAM TOPICAL
Qty: 30 | Refills: 3 | Status: ERX | COMMUNITY
Start: 2024-03-01

## 2024-03-01 RX ORDER — HYDROCORTISONE 25 MG/G
CREAM TOPICAL
Qty: 30 | Refills: 2 | Status: ERX | COMMUNITY
Start: 2024-03-01

## 2024-03-01 ASSESSMENT — LOCATION DETAILED DESCRIPTION DERM
LOCATION DETAILED: INFERIOR THORACIC SPINE
LOCATION DETAILED: RIGHT DISTAL RADIAL DORSAL FOREARM
LOCATION DETAILED: RIGHT SUPERIOR MEDIAL BUCCAL CHEEK
LOCATION DETAILED: LEFT NASAL ALA
LOCATION DETAILED: RIGHT ORAL COMMISSURE
LOCATION DETAILED: RIGHT ANTERIOR PROXIMAL UPPER ARM
LOCATION DETAILED: PERIUMBILICAL SKIN
LOCATION DETAILED: LEFT ANTERIOR DISTAL UPPER ARM
LOCATION DETAILED: LEFT INFERIOR FOREHEAD
LOCATION DETAILED: LEFT ANTERIOR PROXIMAL UPPER ARM
LOCATION DETAILED: RIGHT INFERIOR FOREHEAD
LOCATION DETAILED: LEFT CENTRAL MALAR CHEEK
LOCATION DETAILED: LEFT VENTRAL PROXIMAL FOREARM
LOCATION DETAILED: RIGHT MEDIAL SUPERIOR CHEST
LOCATION DETAILED: LEFT LATERAL ABDOMEN
LOCATION DETAILED: RIGHT ANTERIOR DISTAL UPPER ARM
LOCATION DETAILED: RIGHT VENTRAL PROXIMAL FOREARM
LOCATION DETAILED: LEFT LATERAL SUPERIOR CHEST

## 2024-03-01 ASSESSMENT — LOCATION SIMPLE DESCRIPTION DERM
LOCATION SIMPLE: RIGHT UPPER ARM
LOCATION SIMPLE: RIGHT FOREHEAD
LOCATION SIMPLE: LEFT FOREARM
LOCATION SIMPLE: CHEST
LOCATION SIMPLE: RIGHT CHEEK
LOCATION SIMPLE: LEFT NOSE
LOCATION SIMPLE: LEFT FOREHEAD
LOCATION SIMPLE: RIGHT FOREARM
LOCATION SIMPLE: LEFT CHEEK
LOCATION SIMPLE: RIGHT LIP
LOCATION SIMPLE: ABDOMEN
LOCATION SIMPLE: UPPER BACK
LOCATION SIMPLE: LEFT UPPER ARM

## 2024-03-01 ASSESSMENT — LOCATION ZONE DERM
LOCATION ZONE: NOSE
LOCATION ZONE: ARM
LOCATION ZONE: FACE
LOCATION ZONE: TRUNK
LOCATION ZONE: LIP

## 2024-03-01 NOTE — PROCEDURE: SKIN TAG REMOVAL
Medical Necessity Information: It is in your best interest to select a reason for this procedure from the list below. All of these items fulfill various CMS LCD requirements except the new and changing color options.
Include Z78.9 (Other Specified Conditions Influencing Health Status) As An Associated Diagnosis?: No
Detail Level: Detailed
Add Associated Diagnoses If Applicable When Selecting Medical Necessity: Yes
Consent: Written consent obtained and the risks of skin tag removal was reviewed with the patient including but not limited to bleeding, pigmentary change, infection, pain, and remote possibility of scarring.
Medical Necessity Clause: This procedure was medically necessary because the lesions that were treated were:

## 2024-03-01 NOTE — PROCEDURE: PRESCRIPTION MEDICATION MANAGEMENT
Render In Strict Bullet Format?: No
Initiate Treatment: ketoconazole 2 % topical cream \\nQuantity: 30.0 g  Days Supply: 30\\nSig: Apply very thin layer twice daily to affected areas around the mouth.\\n\\nhydrocortisone 2.5 % topical cream \\nQuantity: 30.0 g  Days Supply: 30\\nSig: Apply to affected areas around the mouth, Monday through Friday at bedtime.
Detail Level: Detailed

## 2025-03-25 ENCOUNTER — APPOINTMENT (OUTPATIENT)
Dept: URBAN - METROPOLITAN AREA CLINIC 302 | Age: 75
Setting detail: DERMATOLOGY
End: 2025-03-25

## 2025-03-25 VITALS — HEIGHT: 74 IN | WEIGHT: 210 LBS

## 2025-03-25 DIAGNOSIS — D18.0 HEMANGIOMA: ICD-10-CM

## 2025-03-25 DIAGNOSIS — L81.4 OTHER MELANIN HYPERPIGMENTATION: ICD-10-CM

## 2025-03-25 DIAGNOSIS — L82.1 OTHER SEBORRHEIC KERATOSIS: ICD-10-CM

## 2025-03-25 DIAGNOSIS — D22 MELANOCYTIC NEVI: ICD-10-CM

## 2025-03-25 DIAGNOSIS — Z85.828 PERSONAL HISTORY OF OTHER MALIGNANT NEOPLASM OF SKIN: ICD-10-CM

## 2025-03-25 DIAGNOSIS — L85.3 XEROSIS CUTIS: ICD-10-CM

## 2025-03-25 PROBLEM — D22.61 MELANOCYTIC NEVI OF RIGHT UPPER LIMB, INCLUDING SHOULDER: Status: ACTIVE | Noted: 2025-03-25

## 2025-03-25 PROBLEM — D22.62 MELANOCYTIC NEVI OF LEFT UPPER LIMB, INCLUDING SHOULDER: Status: ACTIVE | Noted: 2025-03-25

## 2025-03-25 PROBLEM — D22.5 MELANOCYTIC NEVI OF TRUNK: Status: ACTIVE | Noted: 2025-03-25

## 2025-03-25 PROBLEM — D22.39 MELANOCYTIC NEVI OF OTHER PARTS OF FACE: Status: ACTIVE | Noted: 2025-03-25

## 2025-03-25 PROBLEM — D18.01 HEMANGIOMA OF SKIN AND SUBCUTANEOUS TISSUE: Status: ACTIVE | Noted: 2025-03-25

## 2025-03-25 PROCEDURE — OTHER COUNSELING: OTHER

## 2025-03-25 PROCEDURE — OTHER MIPS QUALITY: OTHER

## 2025-03-25 PROCEDURE — OTHER REASSURANCE: OTHER

## 2025-03-25 PROCEDURE — 99213 OFFICE O/P EST LOW 20 MIN: CPT

## 2025-03-25 ASSESSMENT — LOCATION SIMPLE DESCRIPTION DERM
LOCATION SIMPLE: LEFT ELBOW
LOCATION SIMPLE: RIGHT ELBOW
LOCATION SIMPLE: LEFT UPPER ARM
LOCATION SIMPLE: RIGHT FOREHEAD
LOCATION SIMPLE: LEFT FOREHEAD
LOCATION SIMPLE: LEFT PRETIBIAL REGION
LOCATION SIMPLE: LEFT NOSE
LOCATION SIMPLE: RIGHT UPPER ARM
LOCATION SIMPLE: LEFT CHEEK
LOCATION SIMPLE: RIGHT FOREARM
LOCATION SIMPLE: LEFT FOREARM
LOCATION SIMPLE: ABDOMEN
LOCATION SIMPLE: CHEST
LOCATION SIMPLE: RIGHT CHEEK

## 2025-03-25 ASSESSMENT — LOCATION DETAILED DESCRIPTION DERM
LOCATION DETAILED: RIGHT ANTERIOR PROXIMAL UPPER ARM
LOCATION DETAILED: LEFT INFERIOR FOREHEAD
LOCATION DETAILED: RIGHT DISTAL RADIAL DORSAL FOREARM
LOCATION DETAILED: PERIUMBILICAL SKIN
LOCATION DETAILED: RIGHT ANTERIOR DISTAL UPPER ARM
LOCATION DETAILED: LEFT ELBOW
LOCATION DETAILED: RIGHT ELBOW
LOCATION DETAILED: RIGHT INFERIOR FOREHEAD
LOCATION DETAILED: RIGHT SUPERIOR MEDIAL BUCCAL CHEEK
LOCATION DETAILED: LEFT ANTERIOR DISTAL UPPER ARM
LOCATION DETAILED: LEFT ANTERIOR PROXIMAL UPPER ARM
LOCATION DETAILED: LEFT LATERAL DISTAL PRETIBIAL REGION
LOCATION DETAILED: LEFT LATERAL SUPERIOR CHEST
LOCATION DETAILED: RIGHT VENTRAL PROXIMAL FOREARM
LOCATION DETAILED: LEFT NASAL ALA
LOCATION DETAILED: LEFT LATERAL ABDOMEN
LOCATION DETAILED: LEFT CENTRAL MALAR CHEEK
LOCATION DETAILED: LEFT VENTRAL PROXIMAL FOREARM
LOCATION DETAILED: RIGHT MEDIAL SUPERIOR CHEST

## 2025-03-25 ASSESSMENT — LOCATION ZONE DERM
LOCATION ZONE: NOSE
LOCATION ZONE: LEG
LOCATION ZONE: TRUNK
LOCATION ZONE: FACE
LOCATION ZONE: ARM

## 2025-03-25 NOTE — PROCEDURE: COUNSELING
Sunscreen Recommendation Label Override: Broad Spectrum Sunscreen SPF 30+
Detail Level: Detailed
Detail Level: Simple
Detail Level: Zone